# Patient Record
Sex: FEMALE | Race: OTHER | NOT HISPANIC OR LATINO
[De-identification: names, ages, dates, MRNs, and addresses within clinical notes are randomized per-mention and may not be internally consistent; named-entity substitution may affect disease eponyms.]

---

## 2021-10-05 PROBLEM — Z00.00 ENCOUNTER FOR PREVENTIVE HEALTH EXAMINATION: Status: ACTIVE | Noted: 2021-10-05

## 2021-10-06 ENCOUNTER — APPOINTMENT (OUTPATIENT)
Dept: BREAST CENTER | Facility: CLINIC | Age: 46
End: 2021-10-06
Payer: COMMERCIAL

## 2021-10-06 VITALS
WEIGHT: 160 LBS | HEART RATE: 76 BPM | BODY MASS INDEX: 27.31 KG/M2 | HEIGHT: 64 IN | SYSTOLIC BLOOD PRESSURE: 120 MMHG | DIASTOLIC BLOOD PRESSURE: 90 MMHG

## 2021-10-06 DIAGNOSIS — Z80.7 FAMILY HISTORY OF OTHER MALIGNANT NEOPLASMS OF LYMPHOID, HEMATOPOIETIC AND RELATED TISSUES: ICD-10-CM

## 2021-10-06 DIAGNOSIS — N83.519 TORSION OF OVARY AND OVARIAN PEDICLE, UNSPECIFIED SIDE: ICD-10-CM

## 2021-10-06 DIAGNOSIS — Z78.9 OTHER SPECIFIED HEALTH STATUS: ICD-10-CM

## 2021-10-06 PROCEDURE — 99205 OFFICE O/P NEW HI 60 MIN: CPT

## 2021-10-06 NOTE — REVIEW OF SYSTEMS
[Fever] : no fever [Chills] : no chills [Chest Pain] : no chest pain [Palpitations] : no palpitations [Shortness Of Breath] : no shortness of breath [Cough] : no cough [Abdominal Pain] : no abdominal pain [Constipation] : no constipation [Pelvic Pain] : no pelvic pain [Abn Vaginal Bleeding] : no unexplained vaginal bleeding [Joint Swelling] : no joint swelling [Joint Stiffness] : no joint stiffness [Skin Lesions] : no skin lesions [Skin Wound] : no skin wound [Dizziness] : no dizziness [Fainting] : no fainting [Hot Flashes] : no hot flashes [Muscle Weakness] : no muscle weakness [Swollen Glands] : no swollen glands [Swollen Glands In The Neck] : no swollen glands in the neck

## 2021-10-06 NOTE — PAST MEDICAL HISTORY
[Menstruating] : The patient is menstruating [Menarche Age ____] : age at menarche was [unfilled] [Definite ___ (Date)] : the last menstrual period was [unfilled] [Regular Cycle Intervals] : have been regular [Total Preg ___] : G[unfilled] [History of Hormone Replacement Treatment] : has no history of hormone replacement treatment [FreeTextEntry5] : laparoscopic removal of right ovary and fallopian tbe [FreeTextEntry6] : n/a [FreeTextEntry7] : up until 10/5/21 [FreeTextEntry8] : n/a

## 2021-10-06 NOTE — PHYSICAL EXAM
[de-identified] : Breasts: Bilateral breast, axilla, supraclavicular area: No masses, discharge or adenopathy\par  [de-identified] : 1:00 post bx mild swelling at site of biopsy

## 2021-10-06 NOTE — HISTORY OF PRESENT ILLNESS
[FreeTextEntry1] : 10/6/21: Patient is a 45 y.o  nulliparous female ([place] descent) presenting initially with newly diagnosed LEFT 11:00 3fn 0.7invasive mammary carcinoma with lobular features found on screening US. IHC: ER positive 90% [place] positive 90% HER-2 negative, grade 2 with DCIS present. Paternal aunt- breast CA at 39. She denies palp mass or nipple discharge.\par \par 8/20/19 - augustine MG and US - BIRAD 2\par \par 9/1/21: BL screening mammo/US: dense,  LEFT 0.7x0.6x06 cm vertically oriented irregular mass @ 11:00 3cmfn which is suspicious in appearance - BIRAD 4 \par \par 9/29/21: L US core bx -  invasive mammary carcinoma with lobular features  ER positive 90% CA positive 90% HER-2 negative, grade 2 with DCIS present. Heart clip. \par \par \par \par

## 2021-10-07 ENCOUNTER — RESULT REVIEW (OUTPATIENT)
Age: 46
End: 2021-10-07

## 2021-10-07 PROCEDURE — 88321 CONSLTJ&REPRT SLD PREP ELSWR: CPT

## 2021-10-08 ENCOUNTER — OUTPATIENT (OUTPATIENT)
Dept: OUTPATIENT SERVICES | Facility: HOSPITAL | Age: 46
LOS: 1 days | End: 2021-10-08
Payer: COMMERCIAL

## 2021-10-08 DIAGNOSIS — C50.911 MALIGNANT NEOPLASM OF UNSPECIFIED SITE OF RIGHT FEMALE BREAST: ICD-10-CM

## 2021-10-08 PROCEDURE — 88321 CONSLTJ&REPRT SLD PREP ELSWR: CPT

## 2021-10-11 LAB — SURGICAL PATHOLOGY STUDY: SIGNIFICANT CHANGE UP

## 2021-10-14 ENCOUNTER — NON-APPOINTMENT (OUTPATIENT)
Age: 46
End: 2021-10-14

## 2021-10-15 ENCOUNTER — NON-APPOINTMENT (OUTPATIENT)
Age: 46
End: 2021-10-15

## 2021-10-18 ENCOUNTER — RESULT REVIEW (OUTPATIENT)
Age: 46
End: 2021-10-18

## 2021-10-20 ENCOUNTER — APPOINTMENT (OUTPATIENT)
Dept: BREAST CENTER | Facility: CLINIC | Age: 46
End: 2021-10-20

## 2021-10-21 ENCOUNTER — RESULT REVIEW (OUTPATIENT)
Age: 46
End: 2021-10-21

## 2021-10-22 ENCOUNTER — NON-APPOINTMENT (OUTPATIENT)
Age: 46
End: 2021-10-22

## 2021-11-16 ENCOUNTER — APPOINTMENT (OUTPATIENT)
Dept: BREAST CENTER | Facility: AMBULATORY SURGERY CENTER | Age: 46
End: 2021-11-16
Payer: COMMERCIAL

## 2021-11-16 PROCEDURE — 76098 X-RAY EXAM SURGICAL SPECIMEN: CPT | Mod: 59

## 2021-11-16 PROCEDURE — 14000 TIS TRNFR TRUNK 10 SQ CM/<: CPT | Mod: 59

## 2021-11-16 PROCEDURE — 38525 BIOPSY/REMOVAL LYMPH NODES: CPT | Mod: LT

## 2021-11-16 PROCEDURE — 19301 PARTIAL MASTECTOMY: CPT | Mod: LT

## 2021-11-17 ENCOUNTER — RESULT REVIEW (OUTPATIENT)
Age: 46
End: 2021-11-17

## 2021-11-18 PROBLEM — Z80.3 FAMILY HISTORY OF MALIGNANT NEOPLASM OF BREAST: Status: ACTIVE | Noted: 2021-10-06

## 2021-11-24 ENCOUNTER — APPOINTMENT (OUTPATIENT)
Dept: BREAST CENTER | Facility: CLINIC | Age: 46
End: 2021-11-24
Payer: COMMERCIAL

## 2021-11-24 VITALS
DIASTOLIC BLOOD PRESSURE: 83 MMHG | BODY MASS INDEX: 27.31 KG/M2 | SYSTOLIC BLOOD PRESSURE: 132 MMHG | HEIGHT: 64 IN | HEART RATE: 123 BPM | WEIGHT: 160 LBS

## 2021-11-24 DIAGNOSIS — R92.8 OTHER ABNORMAL AND INCONCLUSIVE FINDINGS ON DIAGNOSTIC IMAGING OF BREAST: ICD-10-CM

## 2021-11-24 DIAGNOSIS — Z80.3 FAMILY HISTORY OF MALIGNANT NEOPLASM OF BREAST: ICD-10-CM

## 2021-11-24 PROCEDURE — 99024 POSTOP FOLLOW-UP VISIT: CPT

## 2021-11-29 ENCOUNTER — APPOINTMENT (OUTPATIENT)
Dept: HEMATOLOGY ONCOLOGY | Facility: CLINIC | Age: 46
End: 2021-11-29
Payer: COMMERCIAL

## 2021-11-29 VITALS
TEMPERATURE: 98.4 F | BODY MASS INDEX: 25.95 KG/M2 | OXYGEN SATURATION: 100 % | HEART RATE: 104 BPM | RESPIRATION RATE: 18 BRPM | HEIGHT: 64 IN | SYSTOLIC BLOOD PRESSURE: 112 MMHG | WEIGHT: 152 LBS | DIASTOLIC BLOOD PRESSURE: 73 MMHG

## 2021-11-29 PROCEDURE — 99204 OFFICE O/P NEW MOD 45 MIN: CPT

## 2021-11-29 RX ORDER — FEXOFENADINE HCL 60 MG
CAPSULE ORAL
Refills: 0 | Status: DISCONTINUED | COMMUNITY
End: 2021-11-29

## 2021-11-29 RX ORDER — SERTRALINE HYDROCHLORIDE 25 MG/1
TABLET, FILM COATED ORAL
Refills: 0 | Status: DISCONTINUED | COMMUNITY
End: 2021-11-29

## 2021-11-30 NOTE — HISTORY OF PRESENT ILLNESS
[Disease: _____________________] : Disease: [unfilled] [T: ___] : T[unfilled] [N: ___] : N[unfilled] [M: ___] : M[unfilled] [AJCC Stage: ____] : AJCC Stage: [unfilled] [de-identified] : 46F, with newly diagnosed left invasive lobular carcinoma s/p 11/16/21 left lumpectomy and SLNB, is referred by Dr. Romelia Berg to discuss adjuvant therapy.\par \par OncHx:\par 9/1/21: BL screening mammo/US: dense, LEFT 0.7 x 0.6 x 06 cm vertically oriented irregular mass @ 11:00 3cmfn which is suspicious in appearance.\par  \par 9/29/21: L US core bx - invasive mammary carcinoma with lobular features ER positive 90% [place] positive 90% HER-2 negative, grade 2 with DCIS present. Heart clip. \par \par 10/14/21: MRI- Dense. L 0.8cm upper central clumped linear nonmass enhancement, bx proven malignancy. No evidence of skin, nipple or chest wall involvement. L 0.95cm central posterior, 4FN clumped linear nonmass enhancement. R 1.1cm central posterior, 3FN clumped nonmass enhancement. R 11:00, 4FN clumped nonmass enhancement w/ adjacent mass. No adenopathy. \par \par 10/18/21: L MRI guided bx for central enhancement- "Carlos" clip. LCIS and small IDP. Concordant.\par the clip is approximately 1 cm medial to the biopsy site which was located centrally in the breast\par \par 10/22/21: R MRI guided bx x2:\par SITE 1: R upper outer, 11:00 enhancement- "Cylinder" clip. Benign breast tissue w/ PASH. \par SITE 2: R central enhancement- "Barbell" clip. Fibrocystic changes. Concordant.\par \par 11/16/21: L NLOC lumpectomy and SLNS: 0.8cm ILC- clear margins, extensive LCIS, 0/1 LN negative for carcinoma \par \par Paternal aunt- breast CA at 39. \par  [de-identified] : moderately differentiated invasive lobular carcinoma [de-identified] : ER+, CO+, HER2-, Oncotype RS pending [de-identified] : Genetic testing negative

## 2021-12-02 ENCOUNTER — NON-APPOINTMENT (OUTPATIENT)
Age: 46
End: 2021-12-02

## 2021-12-09 ENCOUNTER — NON-APPOINTMENT (OUTPATIENT)
Age: 46
End: 2021-12-09

## 2021-12-15 ENCOUNTER — APPOINTMENT (OUTPATIENT)
Dept: RADIATION ONCOLOGY | Facility: CLINIC | Age: 46
End: 2021-12-15
Payer: COMMERCIAL

## 2021-12-15 VITALS
WEIGHT: 149 LBS | BODY MASS INDEX: 25.44 KG/M2 | OXYGEN SATURATION: 100 % | HEIGHT: 64 IN | HEART RATE: 105 BPM | SYSTOLIC BLOOD PRESSURE: 135 MMHG | DIASTOLIC BLOOD PRESSURE: 84 MMHG | TEMPERATURE: 98.6 F

## 2021-12-15 PROCEDURE — 99202 OFFICE O/P NEW SF 15 MIN: CPT | Mod: 25

## 2021-12-15 NOTE — REVIEW OF SYSTEMS
[Negative] : Endocrine [Constipation: Grade 0] : Constipation: Grade 0 [Diarrhea: Grade 0] : Diarrhea: Grade 0 [Dysphagia: Grade 0] : Dysphagia: Grade 0 [Nausea: Grade 0] : Nausea: Grade 0 [Vomiting: Grade 0] : Vomiting: Grade 0 [Fatigue: Grade 0] : Fatigue: Grade 0 [Localized Edema: Grade 0] : Localized Edema: Grade 0  [Skin Atrophy: Grade 0] : Skin Atrophy: Grade 0 [Skin Hyperpigmentation: Grade 0] : Skin Hyperpigmentation: Grade 0 [Skin Induration: Grade 0] : Skin Induration: Grade 0 [Dermatitis Radiation: Grade 0] : Dermatitis Radiation: Grade 0

## 2021-12-16 ENCOUNTER — NON-APPOINTMENT (OUTPATIENT)
Age: 46
End: 2021-12-16

## 2021-12-29 ENCOUNTER — TRANSCRIPTION ENCOUNTER (OUTPATIENT)
Age: 46
End: 2021-12-29

## 2022-01-21 NOTE — REVIEW OF SYSTEMS
[Constipation: Grade 0] : Constipation: Grade 0 [Diarrhea: Grade 0] : Diarrhea: Grade 0 [Dysphagia: Grade 0] : Dysphagia: Grade 0 [Nausea: Grade 0] : Nausea: Grade 0 [Vomiting: Grade 0] : Vomiting: Grade 0 [Fatigue: Grade 0] : Fatigue: Grade 0 [Localized Edema: Grade 0] : Localized Edema: Grade 0  [Skin Atrophy: Grade 0] : Skin Atrophy: Grade 0 [Skin Hyperpigmentation: Grade 0] : Skin Hyperpigmentation: Grade 0 [Skin Induration: Grade 0] : Skin Induration: Grade 0 [Dermatitis Radiation: Grade 0] : Dermatitis Radiation: Grade 0 [Negative] : Endocrine

## 2022-01-24 ENCOUNTER — NON-APPOINTMENT (OUTPATIENT)
Age: 47
End: 2022-01-24

## 2022-01-24 NOTE — PHYSICAL EXAM
[Symmetric] : breasts are symmetric [Breast Abnormal Lactation (Galactorrhea)] : no nipple discharge [No UE Edema] : there is no upper extremity edema [Normal] : no respiratory distress, lungs were clear to auscultation bilaterally [Normal] : normal external genitalia [de-identified] : Healing surgical incisions in the upper aspect (12 o'clock) of the  left breast and left axilla

## 2022-01-24 NOTE — HISTORY OF PRESENT ILLNESS
[FreeTextEntry1] : 01/24/2022:  has completed 4/15 fx to the left breast. Patient states she has no symptoms. No redness noted on left breast area. Compliant with Aquaphor. Continue RT.\par \par Cinda Marshall was seen today to discuss post-operative radiation for her breast cancer.   She is a 46 year old with a family history who had an abnormal screening mammogram in September.  This found a suspicious 7 mm nodule at 11N3 in the left breast.  On biopsy this proved to be malignant.  After discussion she elected breast conservation.  On 11/17/21 she underwent a local excision and sentinel node biopsy.  \par The final patholgoy:\par 0.8 cm, grade 2 lobular cancer.  Estrogen (95%) and progesterone (95%) receptors were positive.  HER2/damian was not amplified. \par All final margins were clear.  \par One sentinel node was negative (0/1) \par There was LCIS\par No genetic mutations\par Oncotype-DX score was 9, no chemo benefit (<1%), 3% distant recurrence risk at 9 yrs. \par \par \par MS.MAUREEN MARSHALL is a 46 year old female nulliparous female presenting initially with newly diagnosed LEFT BREAST CANCER 11:00 3fn 0.7 invasive mammary carcinoma with lobular features found on screening US. \par IHC: ER positive 90% [place] positive 90% HER-2 negative, GRADE 2 with DCIS present. \par Paternal aunt- breast CA at 39. She denies palp mass or nipple discharge.\par ONCOTYPE RS of 9. TO START TAMOXIFEN AFTER RT.\par Patient presents with finding during breast ultrasound which picked up on the lesions. There were 2 benign lesion on the right breast and 1 malignant lesion on the left breast. Later a 2nd lesion on right breast was picked up by an MRI. \par PSH: Left breast Lumpectomy and [place] LN Biopsy done on 11/16/21\par \par 8/20/19 - Bilateral mammography and US - BIRAD 2\par \par 9/1/21: Bilateral screening mammograph/US: dense, LEFT 0.7x0.6x06 cm vertically oriented irregular mass @ 11:00 3cmfn which is suspicious in appearance - BIRAD 4 \par \par 9/29/21: Left US core biopsy - invasive mammary carcinoma with lobular features ER positive 90% [place] positive 90% HER-2 negative, grade 2 with DCIS present. Heart clip. \par \par 10/14/21: MRI- Dense. L 0.8cm upper central clumped linear nonmass enhancement, bx proven malignancy. No evidence of skin, nipple or chest wall involvement. L 0.95cm central posterior, 4FN clumped linear nonmass enhancement (Rec. MRI guided bx). R 1.1cm central posterior, 3FN clumped nonmass enhancement (MRI bx rec.). R 11:00, 4FN clumped nonmass enhancement w/ adjacent mass (MR bx rec.) No adenopathy. BIRADS 4\par \par 10/18/21: L MRI guided bx for central enhancement- "Carlos" clip. LCIS and small IDP. Concordant.\par The clip is approximately 1 cm medial to the biopsy site which was located centrally in the breast\par \par 10/22/21: R MRI guided bx x2:\par SITE 1: R upper outer, 11:00 enhancement- "Cylinder" clip. Benign breast tissue w/ PASH. \par SITE 2: R central enhancement- "Barbell" clip. Fibrocystic changes. Concordant.\par \par 11/16/21: L NLOC lumpectomy and SLNS: 0.8cm ILC- clear margins, extensive LCIS, 0/1 LN negative for carcinoma \par \par 11/17/2021: PATHOLOGY REPORT \par 1)LEFT BREAST EXCISION:\par -Invasive lobular carcinoma, moderately differentiated.\par -Invasive carcinoma spans 0.8cm by microscopic measurement.\par -Extensive lobular carcinoma in situ (LCIS).\par Margins as present on this specimen\par   Lateral: Positive\par   Inferior: 4.5 mm\par   All remaining margins: >5mm\par -Biopsy site changes are present.\par \par 2)LEFT BREAST ANTERIOR INFERIOR MARGINS \par -Lobular carcinoma in situ (LCIS)\par \par TUMOR\par Histological Type: Invasive Lobular Carcinoma\par Glandular (Acinar)/Tubular Differentiation: Score 3\par Nuclear Pleomorphism: score 2\par Mitotic rate: score 1\par Overall grade: Grade 2 (scores 6 or 7)\par Tumor size: greatest dimension of largest invasive focus (mm) -8mm\par Tumor Focality:Single focus of invasive carcinoma\par Ductal Carcinoma in Situ (DCIS): Not present\par Lobular Carcinoma in Situ (LCIS): present, extensive.\par \par HEME ONCOLOGIST: \par BREAST SURGEON: DR.ROSENBAUM PARKER\par \par 12/15/2021: NEW CONSULT\par Patient was seen as a new consult. Patient is asymptomatic. Surgical incisions on left breast and left axilla. Educated on the procedure and side effects of RT and on the importance of having Aquaphor or Eucerin on the radiated site.

## 2022-01-24 NOTE — VITALS
[Maximal Pain Intensity: 0/10] : 0/10 [Least Pain Intensity: 0/10] : 0/10 [90: Able to carry normal activity; minor signs or symptoms of disease.] : 90: Able to carry normal activity; minor signs or symptoms of disease.  [90: Minor restrictions in physically strenous activity.] : 90: Minor restrictions in physically strenuous activity.

## 2022-01-31 ENCOUNTER — NON-APPOINTMENT (OUTPATIENT)
Age: 47
End: 2022-01-31

## 2022-01-31 VITALS
HEIGHT: 64 IN | OXYGEN SATURATION: 100 % | WEIGHT: 148 LBS | DIASTOLIC BLOOD PRESSURE: 75 MMHG | HEART RATE: 104 BPM | TEMPERATURE: 98.1 F | BODY MASS INDEX: 25.27 KG/M2 | SYSTOLIC BLOOD PRESSURE: 124 MMHG

## 2022-01-31 NOTE — HISTORY OF PRESENT ILLNESS
[FreeTextEntry1] : 01/31/2022: OTV-  has completed 9/15 fx to the Left partial breast.  She is doing well with minimal redness.  Her chief complaint is of fatigue, however she states it is not severe.  \par \par 01/24/2022: ZHEN Brandt has completed 4/15 fx to the left breast. Patient states she has no symptoms. No redness noted on left breast area. Compliant with Aquaphor. Continue RT.\par \par Cinda Marshall was seen today to discuss post-operative radiation for her breast cancer.   She is a 46 year old with a family history who had an abnormal screening mammogram in September.  This found a suspicious 7 mm nodule at 11N3 in the left breast.  On biopsy this proved to be malignant.  After discussion she elected breast conservation.  On 11/17/21 she underwent a local excision and sentinel node biopsy.  \par The final patholgoy:\par 0.8 cm, grade 2 lobular cancer.  Estrogen (95%) and progesterone (95%) receptors were positive.  HER2/damian was not amplified. \par All final margins were clear.  \par One sentinel node was negative (0/1) \par There was LCIS\par No genetic mutations\par Oncotype-DX score was 9, no chemo benefit (<1%), 3% distant recurrence risk at 9 yrs. \par \par \par MS.MAUREEN MARSHALL is a 46 year old female nulliparous female presenting initially with newly diagnosed LEFT BREAST CANCER 11:00 3fn 0.7 invasive mammary carcinoma with lobular features found on screening US. \par IHC: ER positive 90% [place] positive 90% HER-2 negative, GRADE 2 with DCIS present. \par Paternal aunt- breast CA at 39. She denies palp mass or nipple discharge.\par ONCOTYPE RS of 9. TO START TAMOXIFEN AFTER RT.\par Patient presents with finding during breast ultrasound which picked up on the lesions. There were 2 benign lesion on the right breast and 1 malignant lesion on the left breast. Later a 2nd lesion on right breast was picked up by an MRI. \par PSH: Left breast Lumpectomy and [place] LN Biopsy done on 11/16/21\par \par 8/20/19 - Bilateral mammography and US - BIRAD 2\par \par 9/1/21: Bilateral screening mammograph/US: dense, LEFT 0.7x0.6x06 cm vertically oriented irregular mass @ 11:00 3cmfn which is suspicious in appearance - BIRAD 4 \par \par 9/29/21: Left US core biopsy - invasive mammary carcinoma with lobular features ER positive 90% [place] positive 90% HER-2 negative, grade 2 with DCIS present. Heart clip. \par \par 10/14/21: MRI- Dense. L 0.8cm upper central clumped linear nonmass enhancement, bx proven malignancy. No evidence of skin, nipple or chest wall involvement. L 0.95cm central posterior, 4FN clumped linear nonmass enhancement (Rec. MRI guided bx). R 1.1cm central posterior, 3FN clumped nonmass enhancement (MRI bx rec.). R 11:00, 4FN clumped nonmass enhancement w/ adjacent mass (MR bx rec.) No adenopathy. BIRADS 4\par \par 10/18/21: L MRI guided bx for central enhancement- "Carlos" clip. LCIS and small IDP. Concordant.\par The clip is approximately 1 cm medial to the biopsy site which was located centrally in the breast\par \par 10/22/21: R MRI guided bx x2:\par SITE 1: R upper outer, 11:00 enhancement- "Cylinder" clip. Benign breast tissue w/ PASH. \par SITE 2: R central enhancement- "Barbell" clip. Fibrocystic changes. Concordant.\par \par 11/16/21: L NLOC lumpectomy and SLNS: 0.8cm ILC- clear margins, extensive LCIS, 0/1 LN negative for carcinoma \par \par 11/17/2021: PATHOLOGY REPORT \par 1)LEFT BREAST EXCISION:\par -Invasive lobular carcinoma, moderately differentiated.\par -Invasive carcinoma spans 0.8cm by microscopic measurement.\par -Extensive lobular carcinoma in situ (LCIS).\par Margins as present on this specimen\par   Lateral: Positive\par   Inferior: 4.5 mm\par   All remaining margins: >5mm\par -Biopsy site changes are present.\par \par 2)LEFT BREAST ANTERIOR INFERIOR MARGINS \par -Lobular carcinoma in situ (LCIS)\par \par TUMOR\par Histological Type: Invasive Lobular Carcinoma\par Glandular (Acinar)/Tubular Differentiation: Score 3\par Nuclear Pleomorphism: score 2\par Mitotic rate: score 1\par Overall grade: Grade 2 (scores 6 or 7)\par Tumor size: greatest dimension of largest invasive focus (mm) -8mm\par Tumor Focality:Single focus of invasive carcinoma\par Ductal Carcinoma in Situ (DCIS): Not present\par Lobular Carcinoma in Situ (LCIS): present, extensive.\par \par HEME ONCOLOGIST: \par BREAST SURGEON: DR.ROSENBAUM PARKER\par \par 12/15/2021: NEW CONSULT\par Patient was seen as a new consult. Patient is asymptomatic. Surgical incisions on left breast and left axilla. Educated on the procedure and side effects of RT and on the importance of having Aquaphor or Eucerin on the radiated site.

## 2022-01-31 NOTE — DISEASE MANAGEMENT
[Pathological] : TNM Stage: p [IA] : IA [TTNM] : 1b [NTNM] : 0 [MTNM] : 0 [de-identified] : [id number] [de-identified] : [id number] [de-identified] : LEFT Partial Breast

## 2022-01-31 NOTE — PHYSICAL EXAM
[Symmetric] : breasts are symmetric [Breast Abnormal Lactation (Galactorrhea)] : no nipple discharge [No UE Edema] : there is no upper extremity edema [Normal] : no focal deficits [de-identified] : Faint erythema of the skin of the left breast.

## 2022-02-07 ENCOUNTER — NON-APPOINTMENT (OUTPATIENT)
Age: 47
End: 2022-02-07

## 2022-02-07 VITALS
HEART RATE: 100 BPM | DIASTOLIC BLOOD PRESSURE: 90 MMHG | WEIGHT: 146 LBS | SYSTOLIC BLOOD PRESSURE: 138 MMHG | OXYGEN SATURATION: 100 % | TEMPERATURE: 98.1 F | BODY MASS INDEX: 24.92 KG/M2 | HEIGHT: 64 IN

## 2022-02-07 NOTE — DISEASE MANAGEMENT
[Pathological] : TNM Stage: p [IA] : IA [TTNM] : 1b [NTNM] : 0 [MTNM] : 0 [de-identified] : [id number] [de-identified] : [id number] [de-identified] : LEFT Partial Breast Pt received semisupine in bed NAD, +IVL, +tele, +cont pulse ox, +BP cuff, VSS, agreeable to participate in therapy at this time

## 2022-02-07 NOTE — PHYSICAL EXAM
[Symmetric] : breasts are symmetric [Breast Abnormal Lactation (Galactorrhea)] : no nipple discharge [No UE Edema] : there is no upper extremity edema [Normal] : no focal deficits [de-identified] : modest, albeir confluent, erythema of the left breast.

## 2022-02-07 NOTE — REVIEW OF SYSTEMS
[Anal Pain: Grade 0] : Anal Pain: Grade 0 [Constipation: Grade 0] : Constipation: Grade 0 [Diarrhea: Grade 0] : Diarrhea: Grade 0 [Dyspepsia: Grade 0] : Dyspepsia: Grade 0 [Dysphagia: Grade 0] : Dysphagia: Grade 0 [Nausea: Grade 0] : Nausea: Grade 0 [Vomiting: Grade 0] : Vomiting: Grade 0 [Edema Limbs: Grade 0] : Edema Limbs: Grade 0  [Fatigue: Grade 0] : Fatigue: Grade 0 [Localized Edema: Grade 0] : Localized Edema: Grade 0  [Neck Edema: Grade 0] : Neck Edema: Grade 0 [Alopecia: Grade 0] : Alopecia: Grade 0 [Pruritus: Grade 0] : Pruritus: Grade 0 [Skin Atrophy: Grade 0] : Skin Atrophy: Grade 0 [Skin Hyperpigmentation: Grade 0] : Skin Hyperpigmentation: Grade 0 [Skin Induration: Grade 0] : Skin Induration: Grade 0 [Dermatitis Radiation: Grade 1 - Faint erythema or dry desquamation] : Dermatitis Radiation: Grade 1 - Faint erythema or dry desquamation

## 2022-02-07 NOTE — HISTORY OF PRESENT ILLNESS
[FreeTextEntry1] : 02/07/2022: FINAL OTVRahul Brandt has completed 14/15 fx to the Left partial breast. Minimal redness noted on left breast. In addition, patient slipped and fell on her breast on Friday. Denies pain or fatigue. Continue RT.\par \par 01/31/2022: ZHEN Brandt has completed 9/15 fx to the Left partial breast.  She is doing well with minimal redness.  Her chief complaint is of fatigue, however she states it is not severe.  \par \par 01/24/2022: OTDANK Brandt has completed 4/15 fx to the left breast. Patient states she has no symptoms. No redness noted on left breast area. Compliant with Aquaphor. Continue RT.\par \par Cinda Marshall was seen today to discuss post-operative radiation for her breast cancer.   She is a 46 year old with a family history who had an abnormal screening mammogram in September.  This found a suspicious 7 mm nodule at 11N3 in the left breast.  On biopsy this proved to be malignant.  After discussion she elected breast conservation.  On 11/17/21 she underwent a local excision and sentinel node biopsy.  \par The final patholgoy:\par 0.8 cm, grade 2 lobular cancer.  Estrogen (95%) and progesterone (95%) receptors were positive.  HER2/damian was not amplified. \par All final margins were clear.  \par One sentinel node was negative (0/1) \par There was LCIS\par No genetic mutations\par Oncotype-DX score was 9, no chemo benefit (<1%), 3% distant recurrence risk at 9 yrs. \par \par \par MS.MAUREEN MARSHALL is a 46 year old female nulliparous female presenting initially with newly diagnosed LEFT BREAST CANCER 11:00 3fn 0.7 invasive mammary carcinoma with lobular features found on screening US. \par IHC: ER positive 90% [place] positive 90% HER-2 negative, GRADE 2 with DCIS present. \par Paternal aunt- breast CA at 39. She denies palp mass or nipple discharge.\par ONCOTYPE RS of 9. TO START TAMOXIFEN AFTER RT.\par Patient presents with finding during breast ultrasound which picked up on the lesions. There were 2 benign lesion on the right breast and 1 malignant lesion on the left breast. Later a 2nd lesion on right breast was picked up by an MRI. \par PSH: Left breast Lumpectomy and [place] LN Biopsy done on 11/16/21\par \par 8/20/19 - Bilateral mammography and US - BIRAD 2\par \par 9/1/21: Bilateral screening mammograph/US: dense, LEFT 0.7x0.6x06 cm vertically oriented irregular mass @ 11:00 3cmfn which is suspicious in appearance - BIRAD 4 \par \par 9/29/21: Left US core biopsy - invasive mammary carcinoma with lobular features ER positive 90% [place] positive 90% HER-2 negative, grade 2 with DCIS present. Heart clip. \par \par 10/14/21: MRI- Dense. L 0.8cm upper central clumped linear nonmass enhancement, bx proven malignancy. No evidence of skin, nipple or chest wall involvement. L 0.95cm central posterior, 4FN clumped linear nonmass enhancement (Rec. MRI guided bx). R 1.1cm central posterior, 3FN clumped nonmass enhancement (MRI bx rec.). R 11:00, 4FN clumped nonmass enhancement w/ adjacent mass (MR bx rec.) No adenopathy. BIRADS 4\par \par 10/18/21: L MRI guided bx for central enhancement- "Carlos" clip. LCIS and small IDP. Concordant.\par The clip is approximately 1 cm medial to the biopsy site which was located centrally in the breast\par \par 10/22/21: R MRI guided bx x2:\par SITE 1: R upper outer, 11:00 enhancement- "Cylinder" clip. Benign breast tissue w/ PASH. \par SITE 2: R central enhancement- "Barbell" clip. Fibrocystic changes. Concordant.\par \par 11/16/21: L NLOC lumpectomy and SLNS: 0.8cm ILC- clear margins, extensive LCIS, 0/1 LN negative for carcinoma \par \par 11/17/2021: PATHOLOGY REPORT \par 1)LEFT BREAST EXCISION:\par -Invasive lobular carcinoma, moderately differentiated.\par -Invasive carcinoma spans 0.8cm by microscopic measurement.\par -Extensive lobular carcinoma in situ (LCIS).\par Margins as present on this specimen\par   Lateral: Positive\par   Inferior: 4.5 mm\par   All remaining margins: >5mm\par -Biopsy site changes are present.\par \par 2)LEFT BREAST ANTERIOR INFERIOR MARGINS \par -Lobular carcinoma in situ (LCIS)\par \par TUMOR\par Histological Type: Invasive Lobular Carcinoma\par Glandular (Acinar)/Tubular Differentiation: Score 3\par Nuclear Pleomorphism: score 2\par Mitotic rate: score 1\par Overall grade: Grade 2 (scores 6 or 7)\par Tumor size: greatest dimension of largest invasive focus (mm) -8mm\par Tumor Focality:Single focus of invasive carcinoma\par Ductal Carcinoma in Situ (DCIS): Not present\par Lobular Carcinoma in Situ (LCIS): present, extensive.\par \par HEME ONCOLOGIST: \par BREAST SURGEON: DR.ROSENBAUM PARKER\par \par 12/15/2021: NEW CONSULT\par Patient was seen as a new consult. Patient is asymptomatic. Surgical incisions on left breast and left axilla. Educated on the procedure and side effects of RT and on the importance of having Aquaphor or Eucerin on the radiated site.

## 2022-02-10 ENCOUNTER — APPOINTMENT (OUTPATIENT)
Dept: HEMATOLOGY ONCOLOGY | Facility: CLINIC | Age: 47
End: 2022-02-10
Payer: COMMERCIAL

## 2022-02-10 VITALS
TEMPERATURE: 98.5 F | HEART RATE: 101 BPM | DIASTOLIC BLOOD PRESSURE: 79 MMHG | WEIGHT: 146 LBS | BODY MASS INDEX: 24.92 KG/M2 | HEIGHT: 64 IN | SYSTOLIC BLOOD PRESSURE: 124 MMHG | RESPIRATION RATE: 18 BRPM | OXYGEN SATURATION: 100 %

## 2022-02-10 PROCEDURE — 99214 OFFICE O/P EST MOD 30 MIN: CPT

## 2022-02-10 NOTE — PHYSICAL EXAM
[Normal] : affect appropriate [de-identified] : No mass palpated b/l [de-identified] : Radiation-induced skin changes over left breast area.

## 2022-02-10 NOTE — HISTORY OF PRESENT ILLNESS
[Disease: _____________________] : Disease: [unfilled] [T: ___] : T[unfilled] [N: ___] : N[unfilled] [M: ___] : M[unfilled] [AJCC Stage: ____] : AJCC Stage: [unfilled] [de-identified] : 46F, with newly diagnosed left invasive lobular carcinoma s/p 11/16/21 left lumpectomy and SLNB, is referred by Dr. Romelia Berg to discuss adjuvant therapy.\par \par OncHx:\par 9/1/21: BL screening mammo/US: dense, LEFT 0.7 x 0.6 x 06 cm vertically oriented irregular mass @ 11:00 3cmfn which is suspicious in appearance.\par  \par 9/29/21: L US core bx - invasive mammary carcinoma with lobular features ER positive 90% FL positive 90% HER-2 negative, grade 2 with DCIS present. Heart clip. \par \par 10/14/21: MRI- Dense. L 0.8cm upper central clumped linear nonmass enhancement, bx proven malignancy. No evidence of skin, nipple or chest wall involvement. L 0.95cm central posterior, 4FN clumped linear nonmass enhancement. R 1.1cm central posterior, 3FN clumped nonmass enhancement. R 11:00, 4FN clumped nonmass enhancement w/ adjacent mass. No adenopathy. \par \par 10/18/21: L MRI guided bx for central enhancement- "Carlos" clip. LCIS and small IDP. Concordant.\par the clip is approximately 1 cm medial to the biopsy site which was located centrally in the breast\par \par 10/22/21: R MRI guided bx x2:\par SITE 1: R upper outer, 11:00 enhancement- "Cylinder" clip. Benign breast tissue w/ PASH. \par SITE 2: R central enhancement- "Barbell" clip. Fibrocystic changes. Concordant.\par \par 11/16/21: L NLOC lumpectomy and SLNS: 0.8cm ILC- clear margins, extensive LCIS, 0/1 LN negative for carcinoma \par \par Paternal aunt- breast CA at 39. \par  [de-identified] : moderately differentiated invasive lobular carcinoma [de-identified] : ER+, NH+, HER2-, Oncotype RS 9 [de-identified] : Genetic testing negative [de-identified] : She completed RT on 2/8/22. She has mild radiation-induced erythema, using Eucerin cream.  She is feeling well overall and in good spirits.

## 2022-02-15 NOTE — HISTORY OF PRESENT ILLNESS
[FreeTextEntry1] : Cinda Marshall was seen today to discuss post-operative radiation for her breast cancer.   She is a 46 year old with a family history who had an abnormal screening mammogram in September.  This found a suspicious 7 mm nodule at 11N3 in the left breast.  On biopsy this proved to be malignant.  After discussion she elected breast conservation.  On 11/17/21 she underwent a local excision and sentinel node biopsy.  \par The final patholgoy:\par 0.8 cm, grade 2 lobular cancer.  Estrogen (95%) and progesterone (95%) receptors were positive.  HER2/damian was amplified. \par All final margins were clear.  \par One sentinel node was negative (0/1) \par There was LCIS\par No genetic mutations\par Oncotype-DX score was 9, no chemo benefit (<1%), 3% distant recurrence risk at 9 yrs. \par \par \par MS.MAUREEN MARSHALL is a 46 year old female nulliparous female presenting initially with newly diagnosed LEFT BREAST CANCER 11:00 3fn 0.7 invasive mammary carcinoma with lobular features found on screening US. \par IHC: ER positive 90% ID positive 90% HER-2 negative, GRADE 2 with DCIS present. \par Paternal aunt- breast CA at 39. She denies palp mass or nipple discharge.\par ONCOTYPE RS of 9. TO START TAMOXIFEN AFTER RT.\par Patient presents with finding during breast ultrasound which picked up on the lesions. There were 2 benign lesion on the right breast and 1 malignant lesion on the left breast. Later a 2nd lesion on right breast was picked up by an MRI. \par PSH: Left breast Lumpectomy and [place] LN Biopsy done on 11/16/21\par \par 8/20/19 - Bilateral mammography and US - BIRAD 2\par \par 9/1/21: Bilateral screening mammograph/US: dense, LEFT 0.7x0.6x06 cm vertically oriented irregular mass @ 11:00 3cmfn which is suspicious in appearance - BIRAD 4 \par \par 9/29/21: Left US core biopsy - invasive mammary carcinoma with lobular features ER positive 90% ID positive 90% HER-2 negative, grade 2 with DCIS present. Heart clip. \par \par 10/14/21: MRI- Dense. L 0.8cm upper central clumped linear nonmass enhancement, bx proven malignancy. No evidence of skin, nipple or chest wall involvement. L 0.95cm central posterior, 4FN clumped linear nonmass enhancement (Rec. MRI guided bx). R 1.1cm central posterior, 3FN clumped nonmass enhancement (MRI bx rec.). R 11:00, 4FN clumped nonmass enhancement w/ adjacent mass (MR bx rec.) No adenopathy. BIRADS 4\par \par 10/18/21: L MRI guided bx for central enhancement- "Carlos" clip. LCIS and small IDP. Concordant.\par The clip is approximately 1 cm medial to the biopsy site which was located centrally in the breast\par \par 10/22/21: R MRI guided bx x2:\par SITE 1: R upper outer, 11:00 enhancement- "Cylinder" clip. Benign breast tissue w/ PASH. \par SITE 2: R central enhancement- "Barbell" clip. Fibrocystic changes. Concordant.\par \par 11/16/21: L NLOC lumpectomy and SLNS: 0.8cm ILC- clear margins, extensive LCIS, 0/1 LN negative for carcinoma \par \par 11/17/2021: PATHOLOGY REPORT \par 1)LEFT BREAST EXCISION:\par -Invasive lobular carcinoma, moderately differentiated.\par -Invasive carcinoma spans 0.8cm by microscopic measurement.\par -Extensive lobular carcinoma in situ (LCIS).\par Margins as present on this specimen\par   Lateral: Positive\par   Inferior: 4.5 mm\par   All remaining margins: >5mm\par -Biopsy site changes are present.\par \par 2)LEFT BREAST ANTERIOR INFERIOR MARGINS \par -Lobular carcinoma in situ (LCIS)\par \par TUMOR\par Histological Type: Invasive Lobular Carcinoma\par Glandular (Acinar)/Tubular Differentiation: Score 3\par Nuclear Pleomorphism: score 2\par Mitotic rate: score 1\par Overall grade: Grade 2 (scores 6 or 7)\par Tumor size: greatest dimension of largest invasive focus (mm) -8mm\par Tumor Focality:Single focus of invasive carcinoma\par Ductal Carcinoma in Situ (DCIS): Not present\par Lobular Carcinoma in Situ (LCIS): present, extensive.\par \par HEME ONCOLOGIST: \par BREAST SURGEON: DR.ROSENBAUM PARKER\par \par 12/15/2021: NEW CONSULT\par Patient was seen as a new consult. Patient is asymptomatic. Surgical incisions on left breast and left axilla. Educated on the procedure and side effects of RT and on the importance of having Aquaphor or Eucerin on the radiated site.

## 2022-02-15 NOTE — PHYSICAL EXAM
[Symmetric] : breasts are symmetric [Breast Abnormal Lactation (Galactorrhea)] : no nipple discharge [No UE Edema] : there is no upper extremity edema [Normal] : no focal deficits [de-identified] : Healing surgical incisions in the upper aspect (12 o'clock) of the  left breast and left axilla

## 2022-03-07 ENCOUNTER — APPOINTMENT (OUTPATIENT)
Dept: RADIATION ONCOLOGY | Facility: CLINIC | Age: 47
End: 2022-03-07

## 2022-03-07 VITALS
TEMPERATURE: 98.5 F | RESPIRATION RATE: 16 BRPM | BODY MASS INDEX: 24.59 KG/M2 | SYSTOLIC BLOOD PRESSURE: 131 MMHG | OXYGEN SATURATION: 100 % | WEIGHT: 144 LBS | HEIGHT: 64 IN | HEART RATE: 95 BPM | DIASTOLIC BLOOD PRESSURE: 82 MMHG

## 2022-03-07 NOTE — REVIEW OF SYSTEMS
[Anal Pain: Grade 0] : Anal Pain: Grade 0 [Constipation: Grade 0] : Constipation: Grade 0 [Diarrhea: Grade 0] : Diarrhea: Grade 0 [Dyspepsia: Grade 0] : Dyspepsia: Grade 0 [Dysphagia: Grade 0] : Dysphagia: Grade 0 [Nausea: Grade 0] : Nausea: Grade 0 [Vomiting: Grade 0] : Vomiting: Grade 0 [Edema Limbs: Grade 0] : Edema Limbs: Grade 0  [Fatigue: Grade 0] : Fatigue: Grade 0 [Localized Edema: Grade 0] : Localized Edema: Grade 0  [Neck Edema: Grade 0] : Neck Edema: Grade 0 [Alopecia: Grade 0] : Alopecia: Grade 0 [Pruritus: Grade 0] : Pruritus: Grade 0 [Skin Atrophy: Grade 0] : Skin Atrophy: Grade 0 [Skin Hyperpigmentation: Grade 0] : Skin Hyperpigmentation: Grade 0 [Skin Induration: Grade 0] : Skin Induration: Grade 0 [Cough: Grade 0] : Cough: Grade 0 [Dyspnea: Grade 0] : Dyspnea: Grade 0 [Hiccups: Grade 0] : Hiccups: Grade 0 [Dermatitis Radiation: Grade 0] : Dermatitis Radiation: Grade 0

## 2022-03-07 NOTE — PHYSICAL EXAM
[Symmetric] : breasts are symmetric [Breast Abnormal Lactation (Galactorrhea)] : no nipple discharge [No UE Edema] : there is no upper extremity edema [Normal] : oriented to person, place and time, the affect was normal, the mood was normal and not anxious [de-identified] : erythema has progressed to a "tan."  No complaints or suspicious findings.

## 2022-03-07 NOTE — HISTORY OF PRESENT ILLNESS
[FreeTextEntry1] : Ms.Maureen Marshall has completed 15 Fx or 4005cGy to the Left partial breast from 01/19/2022 to 02/08/2022.\par \par 03/07/2022: PTE- Patient states she is doing well and ran a 5K marathon over the weekend.Seen by Hemeoncologist  last month, started on Tamoxifen (one pill a day). Denies pain or fatigue.No erythema noted on left breast.Patient states that her sister has been diagnosed with Ductal carcinoma (scheduled for bilateral mastectomy) and her brother with Basal skin cell carcinoma on left ear.Patient has questions regarding bilateral mastectomy as an option.\par \par 02/07/2022: FINAL OTV-  has completed 14/15 fx to the Left partial breast. Minimal redness noted on left breast. In addition, patient slipped and fell on her breast on Friday. Denies pain or fatigue. Continue RT.\par \par 01/31/2022: OTV-  has completed 9/15 fx to the Left partial breast.  She is doing well with minimal redness.  Her chief complaint is of fatigue, however she states it is not severe.  \par \par 01/24/2022: OTV-  has completed 4/15 fx to the left breast. Patient states she has no symptoms. No redness noted on left breast area. Compliant with Aquaphor. Continue RT.\par \par Cinda Marshall was seen today to discuss post-operative radiation for her breast cancer.   She is a 46 year old with a family history who had an abnormal screening mammogram in September.  This found a suspicious 7 mm nodule at 11N3 in the left breast.  On biopsy this proved to be malignant.  After discussion she elected breast conservation.  On 11/17/21 she underwent a local excision and sentinel node biopsy.  \par The final patholgoy:\par 0.8 cm, grade 2 lobular cancer.  Estrogen (95%) and progesterone (95%) receptors were positive.  HER2/damian was not amplified. \par All final margins were clear.  \par One sentinel node was negative (0/1) \par There was LCIS\par No genetic mutations\par Oncotype-DX score was 9, no chemo benefit (<1%), 3% distant recurrence risk at 9 yrs. \par \par \par MS.MAUREEN MARSHALL is a 46 year old female nulliparous female presenting initially with newly diagnosed LEFT BREAST CANCER 11:00 3fn 0.7 invasive mammary carcinoma with lobular features found on screening US. \par IHC: ER positive 90% DE positive 90% HER-2 negative, GRADE 2 with DCIS present. \par Paternal aunt- breast CA at 39. She denies palp mass or nipple discharge.\par ONCOTYPE RS of 9. TO START TAMOXIFEN AFTER RT.\par Patient presents with finding during breast ultrasound which picked up on the lesions. There were 2 benign lesion on the right breast and 1 malignant lesion on the left breast. Later a 2nd lesion on right breast was picked up by an MRI. \par PSH: Left breast Lumpectomy and [place] LN Biopsy done on 11/16/21\par \par 8/20/19 - Bilateral mammography and US - BIRAD 2\par \par 9/1/21: Bilateral screening mammograph/US: dense, LEFT 0.7x0.6x06 cm vertically oriented irregular mass @ 11:00 3cmfn which is suspicious in appearance - BIRAD 4 \par \par 9/29/21: Left US core biopsy - invasive mammary carcinoma with lobular features ER positive 90% DE positive 90% HER-2 negative, grade 2 with DCIS present. Heart clip. \par \par 10/14/21: MRI- Dense. L 0.8cm upper central clumped linear nonmass enhancement, bx proven malignancy. No evidence of skin, nipple or chest wall involvement. L 0.95cm central posterior, 4FN clumped linear nonmass enhancement (Rec. MRI guided bx). R 1.1cm central posterior, 3FN clumped nonmass enhancement (MRI bx rec.). R 11:00, 4FN clumped nonmass enhancement w/ adjacent mass (MR bx rec.) No adenopathy. BIRADS 4\par \par 10/18/21: L MRI guided bx for central enhancement- "Carlos" clip. LCIS and small IDP. Concordant.\par The clip is approximately 1 cm medial to the biopsy site which was located centrally in the breast\par \par 10/22/21: R MRI guided bx x2:\par SITE 1: R upper outer, 11:00 enhancement- "Cylinder" clip. Benign breast tissue w/ PASH. \par SITE 2: R central enhancement- "Barbell" clip. Fibrocystic changes. Concordant.\par \par 11/16/21: L NLOC lumpectomy and SLNS: 0.8cm ILC- clear margins, extensive LCIS, 0/1 LN negative for carcinoma \par \par 11/17/2021: PATHOLOGY REPORT \par 1)LEFT BREAST EXCISION:\par -Invasive lobular carcinoma, moderately differentiated.\par -Invasive carcinoma spans 0.8cm by microscopic measurement.\par -Extensive lobular carcinoma in situ (LCIS).\par Margins as present on this specimen\par   Lateral: Positive\par   Inferior: 4.5 mm\par   All remaining margins: >5mm\par -Biopsy site changes are present.\par \par 2)LEFT BREAST ANTERIOR INFERIOR MARGINS \par -Lobular carcinoma in situ (LCIS)\par \par TUMOR\par Histological Type: Invasive Lobular Carcinoma\par Glandular (Acinar)/Tubular Differentiation: Score 3\par Nuclear Pleomorphism: score 2\par Mitotic rate: score 1\par Overall grade: Grade 2 (scores 6 or 7)\par Tumor size: greatest dimension of largest invasive focus (mm) -8mm\par Tumor Focality:Single focus of invasive carcinoma\par Ductal Carcinoma in Situ (DCIS): Not present\par Lobular Carcinoma in Situ (LCIS): present, extensive.\par \par HEME ONCOLOGIST: \par BREAST SURGEON: DR.ROSENBAUM PARKER\par \par 12/15/2021: NEW CONSULT\par Patient was seen as a new consult. Patient is asymptomatic. Surgical incisions on left breast and left axilla. Educated on the procedure and side effects of RT and on the importance of having Aquaphor or Eucerin on the radiated site.

## 2022-03-07 NOTE — DISEASE MANAGEMENT
[Pathological] : TNM Stage: p [IA] : IA [TTNM] : 1b [NTNM] : 0 [MTNM] : 0 [de-identified] : 7262 [de-identified] : 3217 [de-identified] : LEFT Partial Breast

## 2022-03-22 ENCOUNTER — NON-APPOINTMENT (OUTPATIENT)
Age: 47
End: 2022-03-22

## 2022-03-28 ENCOUNTER — APPOINTMENT (OUTPATIENT)
Dept: HEMATOLOGY ONCOLOGY | Facility: CLINIC | Age: 47
End: 2022-03-28

## 2022-03-28 ENCOUNTER — OUTPATIENT (OUTPATIENT)
Dept: OUTPATIENT SERVICES | Facility: HOSPITAL | Age: 47
LOS: 1 days | End: 2022-03-28
Payer: COMMERCIAL

## 2022-03-28 ENCOUNTER — RESULT REVIEW (OUTPATIENT)
Age: 47
End: 2022-03-28

## 2022-03-28 PROCEDURE — 76642 ULTRASOUND BREAST LIMITED: CPT | Mod: 26,LT

## 2022-03-28 PROCEDURE — 76642 ULTRASOUND BREAST LIMITED: CPT

## 2022-03-28 NOTE — HISTORY OF PRESENT ILLNESS
[Disease: _____________________] : Disease: [unfilled] [T: ___] : T[unfilled] [N: ___] : N[unfilled] [M: ___] : M[unfilled] [AJCC Stage: ____] : AJCC Stage: [unfilled] [Treatment Protocol] : Treatment Protocol [de-identified] : 46F, with newly diagnosed left invasive lobular carcinoma s/p 11/16/21 left lumpectomy and SLNB with Dr. Romelia Berg, followed by adjuvant RT (1/19/22-2/8/22), who is currently on tamoxifen.\par \par OncHx:\par 9/1/21: BL screening mammo/US: dense, LEFT 0.7 x 0.6 x 06 cm vertically oriented irregular mass @ 11:00 3cmfn which is suspicious in appearance.\par  \par 9/29/21: L US core bx - invasive mammary carcinoma with lobular features ER positive 90% NH positive 90% HER-2 negative, grade 2 with DCIS present. Heart clip. \par \par 10/14/21: MRI- Dense. L 0.8cm upper central clumped linear nonmass enhancement, bx proven malignancy. No evidence of skin, nipple or chest wall involvement. L 0.95cm central posterior, 4FN clumped linear nonmass enhancement. R 1.1cm central posterior, 3FN clumped nonmass enhancement. R 11:00, 4FN clumped nonmass enhancement w/ adjacent mass. No adenopathy. \par \par 10/18/21: L MRI guided bx for central enhancement- "Carlos" clip. LCIS and small IDP. Concordant.\par the clip is approximately 1 cm medial to the biopsy site which was located centrally in the breast\par \par 10/22/21: R MRI guided bx x2:\par SITE 1: R upper outer, 11:00 enhancement- "Cylinder" clip. Benign breast tissue w/ PASH. \par SITE 2: R central enhancement- "Barbell" clip. Fibrocystic changes. Concordant.\par \par 11/16/21: L NLOC lumpectomy and SLNS: 0.8cm ILC- clear margins, extensive LCIS, 0/1 LN negative for carcinoma \par \par 1/19/22-2/8/22 4005 cGy to left partial breast.\par \par Paternal aunt- breast CA at 39. \par  [de-identified] : moderately differentiated invasive lobular carcinoma [de-identified] : ER+, IA+, HER2-, Oncotype RS 9 [de-identified] : Genetic testing negative [FreeTextEntry1] : Tamoxifen 2/2022 - present

## 2022-03-31 ENCOUNTER — NON-APPOINTMENT (OUTPATIENT)
Age: 47
End: 2022-03-31

## 2022-03-31 DIAGNOSIS — N64.59 OTHER SIGNS AND SYMPTOMS IN BREAST: ICD-10-CM

## 2022-04-05 ENCOUNTER — RESULT REVIEW (OUTPATIENT)
Age: 47
End: 2022-04-05

## 2022-04-06 ENCOUNTER — APPOINTMENT (OUTPATIENT)
Dept: ULTRASOUND IMAGING | Facility: HOSPITAL | Age: 47
End: 2022-04-06

## 2022-04-11 ENCOUNTER — NON-APPOINTMENT (OUTPATIENT)
Age: 47
End: 2022-04-11

## 2022-06-01 ENCOUNTER — NON-APPOINTMENT (OUTPATIENT)
Age: 47
End: 2022-06-01

## 2022-07-21 ENCOUNTER — APPOINTMENT (OUTPATIENT)
Dept: BREAST CENTER | Facility: CLINIC | Age: 47
End: 2022-07-21

## 2022-07-21 VITALS
BODY MASS INDEX: 24.24 KG/M2 | WEIGHT: 142 LBS | DIASTOLIC BLOOD PRESSURE: 85 MMHG | HEART RATE: 92 BPM | HEIGHT: 64 IN | SYSTOLIC BLOOD PRESSURE: 131 MMHG

## 2022-07-21 DIAGNOSIS — Z80.8 FAMILY HISTORY OF MALIGNANT NEOPLASM OF OTHER ORGANS OR SYSTEMS: ICD-10-CM

## 2022-07-21 DIAGNOSIS — Z78.9 OTHER SPECIFIED HEALTH STATUS: ICD-10-CM

## 2022-07-21 PROCEDURE — 99214 OFFICE O/P EST MOD 30 MIN: CPT

## 2022-08-04 ENCOUNTER — APPOINTMENT (OUTPATIENT)
Dept: RADIATION ONCOLOGY | Facility: CLINIC | Age: 47
End: 2022-08-04

## 2022-08-09 ENCOUNTER — APPOINTMENT (OUTPATIENT)
Dept: RADIATION ONCOLOGY | Facility: CLINIC | Age: 47
End: 2022-08-09

## 2022-08-11 ENCOUNTER — APPOINTMENT (OUTPATIENT)
Dept: RADIATION ONCOLOGY | Facility: CLINIC | Age: 47
End: 2022-08-11

## 2022-08-17 ENCOUNTER — NON-APPOINTMENT (OUTPATIENT)
Age: 47
End: 2022-08-17

## 2022-08-18 ENCOUNTER — APPOINTMENT (OUTPATIENT)
Dept: HEMATOLOGY ONCOLOGY | Facility: CLINIC | Age: 47
End: 2022-08-18

## 2022-08-18 ENCOUNTER — APPOINTMENT (OUTPATIENT)
Dept: RADIATION ONCOLOGY | Facility: CLINIC | Age: 47
End: 2022-08-18

## 2022-08-18 VITALS
WEIGHT: 141.6 LBS | RESPIRATION RATE: 16 BRPM | HEART RATE: 86 BPM | OXYGEN SATURATION: 100 % | SYSTOLIC BLOOD PRESSURE: 112 MMHG | DIASTOLIC BLOOD PRESSURE: 73 MMHG | BODY MASS INDEX: 24.17 KG/M2 | TEMPERATURE: 98.6 F | HEIGHT: 64 IN

## 2022-08-18 VITALS
RESPIRATION RATE: 18 BRPM | HEIGHT: 64 IN | WEIGHT: 140 LBS | HEART RATE: 112 BPM | BODY MASS INDEX: 23.9 KG/M2 | OXYGEN SATURATION: 100 % | DIASTOLIC BLOOD PRESSURE: 83 MMHG | SYSTOLIC BLOOD PRESSURE: 128 MMHG | TEMPERATURE: 96.5 F

## 2022-08-18 PROCEDURE — 99213 OFFICE O/P EST LOW 20 MIN: CPT

## 2022-08-18 NOTE — DISCUSSION/SUMMARY
[Cancer Type / Location / Histology Subtype: ________] : Cancer Type / Location / Histology Subtype: [unfilled] [Diagnosis Date (year): ____] : Diagnosis Date (year): [unfilled] [I] : I [Surgery] : Surgery: Yes [Surgery Date(s) (year): ____] : Surgery Date(s) (year): [unfilled] [Surgical Procedure / Location / Findings: _________] : Surgical Procedure / Location / Findings: [unfilled] [Radiation] : Radiation: Yes [Body Area Treated: _________] : Body Area Treated: [unfilled] [End Date (year): ____] : End Date (year): [unfilled] [Systemic Therapy (chemotherapy, hormonal therapy, other)] : Systemic Therapy (chemotherapy, hormonal therapy, other): Yes [Genetic / hereditary risk factor(s) or predisposing conditions: __________] : Genetic / hereditary risk factor(s) or predisposing conditions: [unfilled] [Need for onging (adjuvant) treatment for cancer?] : Need for onging (adjuvant) treatment for cancer? Yes [Follow up with Oncologist in _____] : Follow up with Oncologist in [unfilled] [Follow up with Surgeon in _____] : Follow up with Surgeon in [unfilled] [Follow up with Radiation MD in _____] : Follow up with Radiation MD in [unfilled] [Scans: ______] : Scans: [unfilled] [Primary care physician] : primary care physician [Mammogram] : Mammogram [PAP Test] : PAP test [Annual Flu Shot] : annual flu shot [Genetic Counseling] : Genetic Counseling: No [FreeTextEntry1] : Tamoxifen 2/2022-present\par Oncotype RS 9 [FreeTextEntry2] : Tamoxifen for minimum of 5 years [FreeTextEntry7] : Breast cancer support group [phone]\par [facility] cancer care program [email or web address] [FreeTextEntry8] : Dixie Bach, NP [FreeTextEntry9] : 8/18/22

## 2022-08-18 NOTE — HISTORY OF PRESENT ILLNESS
[Disease: _____________________] : Disease: [unfilled] [T: ___] : T[unfilled] [N: ___] : N[unfilled] [M: ___] : M[unfilled] [AJCC Stage: ____] : AJCC Stage: [unfilled] [Treatment Protocol] : Treatment Protocol [de-identified] : 46F, with newly diagnosed left invasive lobular carcinoma s/p 11/16/21 left lumpectomy and SLNB, was referred by Dr. Romelia Berg for adjuvant therapy.  She completed 4005 cGy to the left partial breast (1/19/22-2/8/22).  She has been on tamoxifen since 2/2022.\par \par OncHx:\par 9/1/21: BL screening mammo/US: dense, LEFT 0.7 x 0.6 x 06 cm vertically oriented irregular mass @ 11:00 3cmfn which is suspicious in appearance.\par  \par 9/29/21: L US core bx - invasive mammary carcinoma with lobular features ER positive 90% CT positive 90% HER-2 negative, grade 2 with DCIS present. Heart clip. \par \par 10/14/21: MRI- Dense. L 0.8cm upper central clumped linear nonmass enhancement, bx proven malignancy. No evidence of skin, nipple or chest wall involvement. L 0.95cm central posterior, 4FN clumped linear nonmass enhancement. R 1.1cm central posterior, 3FN clumped nonmass enhancement. R 11:00, 4FN clumped nonmass enhancement w/ adjacent mass. No adenopathy. \par \par 10/18/21: L MRI guided bx for central enhancement- "Carlos" clip. LCIS and small IDP. Concordant.\par the clip is approximately 1 cm medial to the biopsy site which was located centrally in the breast\par \par 10/22/21: R MRI guided bx x2:\par SITE 1: R upper outer, 11:00 enhancement- "Cylinder" clip. Benign breast tissue w/ PASH. \par SITE 2: R central enhancement- "Barbell" clip. Fibrocystic changes. Concordant.\par \par 11/16/21: L NLOC lumpectomy and SLNS: 0.8cm ILC- clear margins, extensive LCIS, 0/1 LN negative for carcinoma \par \par 1/19/22-2/8/22 4005 cGy to left partial breast.\par \par 3/28/22 L breast US: palpable oval complex cyst containing internal echoes in the left breast 3:00 (5N) measuring 29 x 17 x 22 mm.\par \par 4/5/22 left breast 3:00 5cmFN biopsy: breast tissue with cysts with surrounding inflammation and stromal fibrosis.\par \par 5/27/22 MRI breasts: Benign findings. No MR evidence of malignancy.  \par \par \par Paternal aunt- breast CA at 39. \par  [de-identified] : moderately differentiated invasive lobular carcinoma [de-identified] : ER+, OH+, HER2-, Oncotype RS 9 [de-identified] : Genetic testing negative [FreeTextEntry1] : Tamoxifen 2/2022-present [de-identified] : She felt a left lateral breast mass in March, underwent imaging and biopsy which revealed a cyst.  Recent imaging showed benign findings. She saw Dr. Romelia Berg on 7/21/22.  She is planning annual screening b/l mammo and US in September and MRI in May.  She had COVID 2 weeks ago with nasal congestion, feeling well now.  She has been physical active, running race.  She takes tamoxifen every night, tolerating well.  She missed her period in February and March but it has been regular since.  Sister was recently diagnosed with breast cancer at age 46 and had a b/l mastectomy.

## 2022-08-19 NOTE — DISEASE MANAGEMENT
[Pathological] : TNM Stage: p [IA] : IA [TTNM] : 1b [NTNM] : 0 [MTNM] : 0 [de-identified] : 7293 [de-identified] : 9727 [de-identified] : LEFT Partial Breast

## 2022-08-19 NOTE — PHYSICAL EXAM
[] : no respiratory distress [Breast Palpation Mass] : no palpable masses [Breast Abnormal Lactation (Galactorrhea)] : no nipple discharge [No UE Edema] : there is no upper extremity edema [Normal] : oriented to person, place and time, the affect was normal, the mood was normal and not anxious [Symmetric] : breasts are symmetric [de-identified] : Well-healed scar and minimal hyperpigmentation in the area of treatment field on the left, no palpable masses, lumps or lymphadenopathy on either side.

## 2022-08-19 NOTE — HISTORY OF PRESENT ILLNESS
[FreeTextEntry1] : Ms.Maureen Marshall has completed 15 Fx or 4005cGy to the Left partial breast from 01/19/2022 to 02/08/2022.\par \par 08/18/2022- RPA- Today she presents for follow up and states she overall feels well, denies fatigue. The LEFT breast/axilla area shows no appreciable symptomatology related to her adjuvant radiation therapy, without appreciable hyperpigmentation, erythema, and desquamation. LEFT breast/axilla area skin is clean, dry & intact. Pt denies any breast pain or pain otherwise and denies nipple discharge. No upper extremity edema noted. She continues on Tamoxifen with Dr. Montano, plans for f/u 8/18/22. On 7/21/22 she followed up with Dr. Romelia Berg  recent MRI done 5/27/22 BIRADS 2 and next mammogram/US planned for 9/1/22 and MRI planned for 5/1/23.\par \par Attending Attestation: Patient presents for a first follow-up since completing treatment to the left breast with APBI technique and posttreatment visit in February 2022.\par Overall, she is doing well.  She has excellent cosmetic outcome and is quite pleased with her current cosmesis.  There is minimal appreciated we will hyperpigmentation in the area of treatment which per the patient had already improved and is likely to continue improving.  On clinical exam, there are no palpable masses or lumps, there is no lymphadenopathy or any other concerning sign of recurrence.  Patient denied any new pain or nipple discharge.  She had completed her MRI late May 2022 with no concerning findings and her next mammogram with ultrasound is scheduled in early September 2022.  She will follow-up with her team as scheduled.  She is tolerating tamoxifen well and denied any new symptoms such as hot flashes or any other subjective concerns.\par \par Plan: Imaging as above, follow-up with her providers as scheduled, follow-up in this office in 6 months.  Patient amenable to this plan.  She had no further questions or concerns to share, provided with office contact number to reach out earlier if any new issues develop.\par \par Recent Imaging:\par _____________\par 3/28/22: L US- L 2.9cm oval complex cyst containing internal echoes 3:00 5FN (palpable). Rec therapeutic aspiration. BI-RADS 2\par 4/5/22: L US bx 3:00 (cork clip)- breast tissue with cysts with surrounding inflammation and stromal fibrosis (benign and concordant). Rec 6m f/u L MG/US\par 5/27/22: MRI- No MR evidence of malignancy. BI-RADS 2. \par \par \par \par 03/07/2022: PTE- Patient states she is doing well and ran a 5K marathon over the weekend.Seen by Hemeoncologist  last month, started on Tamoxifen (one pill a day). Denies pain or fatigue.No erythema noted on left breast.Patient states that her sister has been diagnosed with Ductal carcinoma (scheduled for bilateral mastectomy) and her brother with Basal skin cell carcinoma on left ear.Patient has questions regarding bilateral mastectomy as an option.\par \par 02/07/2022: FINAL OTV- Ms.Roche has completed 14/15 fx to the Left partial breast. Minimal redness noted on left breast. In addition, patient slipped and fell on her breast on Friday. Denies pain or fatigue. Continue RT.\par \par 01/31/2022: OTV- Ms.Roche has completed 9/15 fx to the Left partial breast.  She is doing well with minimal redness.  Her chief complaint is of fatigue, however she states it is not severe.  \par \par 01/24/2022: OTV- Ms.Roche has completed 4/15 fx to the left breast. Patient states she has no symptoms. No redness noted on left breast area. Compliant with Aquaphor. Continue RT.\par \par Cinda Marshall was seen today to discuss post-operative radiation for her breast cancer.   She is a 46 year old with a family history who had an abnormal screening mammogram in September.  This found a suspicious 7 mm nodule at 11N3 in the left breast.  On biopsy this proved to be malignant.  After discussion she elected breast conservation.  On 11/17/21 she underwent a local excision and sentinel node biopsy.  \par The final patholgoy:\par 0.8 cm, grade 2 lobular cancer.  Estrogen (95%) and progesterone (95%) receptors were positive.  HER2/damian was not amplified. \par All final margins were clear.  \par One sentinel node was negative (0/1) \par There was LCIS\par No genetic mutations\par Oncotype-DX score was 9, no chemo benefit (<1%), 3% distant recurrence risk at 9 yrs. \par \par \par MS.MAUREEN MARSHALL is a 46 year old female nulliparous female presenting initially with newly diagnosed LEFT BREAST CANCER 11:00 3fn 0.7 invasive mammary carcinoma with lobular features found on screening US. \par IHC: ER positive 90% TN positive 90% HER-2 negative, GRADE 2 with DCIS present. \par Paternal aunt- breast CA at 39. She denies palp mass or nipple discharge.\par ONCOTYPE RS of 9. TO START TAMOXIFEN AFTER RT.\par Patient presents with finding during breast ultrasound which picked up on the lesions. There were 2 benign lesion on the right breast and 1 malignant lesion on the left breast. Later a 2nd lesion on right breast was picked up by an MRI. \par PSH: Left breast Lumpectomy and [place] LN Biopsy done on 11/16/21\par \par 8/20/19 - Bilateral mammography and US - BIRAD 2\par \par 9/1/21: Bilateral screening mammograph/US: dense, LEFT 0.7x0.6x06 cm vertically oriented irregular mass @ 11:00 3cmfn which is suspicious in appearance - BIRAD 4 \par \par 9/29/21: Left US core biopsy - invasive mammary carcinoma with lobular features ER positive 90% TN positive 90% HER-2 negative, grade 2 with DCIS present. Heart clip. \par \par 10/14/21: MRI- Dense. L 0.8cm upper central clumped linear nonmass enhancement, bx proven malignancy. No evidence of skin, nipple or chest wall involvement. L 0.95cm central posterior, 4FN clumped linear nonmass enhancement (Rec. MRI guided bx). R 1.1cm central posterior, 3FN clumped nonmass enhancement (MRI bx rec.). R 11:00, 4FN clumped nonmass enhancement w/ adjacent mass (MR bx rec.) No adenopathy. BIRADS 4\par \par 10/18/21: L MRI guided bx for central enhancement- "Carlos" clip. LCIS and small IDP. Concordant.\par The clip is approximately 1 cm medial to the biopsy site which was located centrally in the breast\par \par 10/22/21: R MRI guided bx x2:\par SITE 1: R upper outer, 11:00 enhancement- "Cylinder" clip. Benign breast tissue w/ PASH. \par SITE 2: R central enhancement- "Barbell" clip. Fibrocystic changes. Concordant.\par \par 11/16/21: L NLOC lumpectomy and SLNS: 0.8cm ILC- clear margins, extensive LCIS, 0/1 LN negative for carcinoma \par \par 11/17/2021: PATHOLOGY REPORT \par 1)LEFT BREAST EXCISION:\par -Invasive lobular carcinoma, moderately differentiated.\par -Invasive carcinoma spans 0.8cm by microscopic measurement.\par -Extensive lobular carcinoma in situ (LCIS).\par Margins as present on this specimen\par   Lateral: Positive\par   Inferior: 4.5 mm\par   All remaining margins: >5mm\par -Biopsy site changes are present.\par \par 2)LEFT BREAST ANTERIOR INFERIOR MARGINS \par -Lobular carcinoma in situ (LCIS)\par \par TUMOR\par Histological Type: Invasive Lobular Carcinoma\par Glandular (Acinar)/Tubular Differentiation: Score 3\par Nuclear Pleomorphism: score 2\par Mitotic rate: score 1\par Overall grade: Grade 2 (scores 6 or 7)\par Tumor size: greatest dimension of largest invasive focus (mm) -8mm\par Tumor Focality:Single focus of invasive carcinoma\par Ductal Carcinoma in Situ (DCIS): Not present\par Lobular Carcinoma in Situ (LCIS): present, extensive.\par \par HEME ONCOLOGIST: \par BREAST SURGEON: DR.ROSENBAUM BERG\par \par 12/15/2021: NEW CONSULT\par Patient was seen as a new consult. Patient is asymptomatic. Surgical incisions on left breast and left axilla. Educated on the procedure and side effects of RT and on the importance of having Aquaphor or Eucerin on the radiated site.

## 2022-08-31 ENCOUNTER — APPOINTMENT (OUTPATIENT)
Dept: OBGYN | Facility: CLINIC | Age: 47
End: 2022-08-31

## 2022-08-31 VITALS
HEIGHT: 64 IN | HEART RATE: 85 BPM | DIASTOLIC BLOOD PRESSURE: 85 MMHG | WEIGHT: 136 LBS | SYSTOLIC BLOOD PRESSURE: 135 MMHG | BODY MASS INDEX: 23.22 KG/M2

## 2022-08-31 PROCEDURE — 99386 PREV VISIT NEW AGE 40-64: CPT

## 2022-08-31 NOTE — REVIEW OF SYSTEMS
[Anxiety] : anxiety [Negative] : Heme/Lymph [FreeTextEntry2] : got covid vaccine, had covid in august [de-identified] : hx of breast ca this past yr, radiation [de-identified] : hx of zoloft in past

## 2022-08-31 NOTE — PLAN
[FreeTextEntry1] : annual :pap and hpv\par \par s/p breast ca treatment stage 1\par doing well\par \par on tamoxifen, menses regular and 3 days\par recommend f/u in 6 months and I will check uterus, told to monitor menses closely\par \par s/a in past with female partners, d/w pt lubrication issues

## 2022-08-31 NOTE — HISTORY OF PRESENT ILLNESS
[Patient reported mammogram was abnormal] : Patient reported mammogram was abnormal [Patient reported breast sonogram was abnormal] : Patient reported breast sonogram was abnormal [Patient reported PAP Smear was normal] : Patient reported PAP Smear was normal [N] : Patient denies prior pregnancies [Normal Amount/Duration] :  normal amount and duration [Regular Cycle Intervals] : periods have been regular [Previously active] : previously active [Women] : women [TextBox_19] : Breast Cancer in 10/5/2021 [LMPDate] : 08/21/2022 [MensesFreq] : 28 [MensesLength] : 3 [PGHxTotal] : 0 [PGHxFullTerm] : 0 [PGHxPremature] : 0 [PGHxAbortions] : 0 [[facility]] : 0 [PGHxABInduced] : 0 [PGHxABSpont] : 0 [PGHxEctopic] : 0 [PGHxMultBirths] : 0 [FreeTextEntry1] : 8/21/22

## 2022-08-31 NOTE — PHYSICAL EXAM
[Chaperone Present] : A chaperone was present in the examining room during all aspects of the physical examination [Appropriately responsive] : appropriately responsive [Alert] : alert [No Acute Distress] : no acute distress [No Lymphadenopathy] : no lymphadenopathy [Regular Rate Rhythm] : regular rate rhythm [Clear to Auscultation B/L] : clear to auscultation bilaterally [Soft] : soft [Non-tender] : non-tender [Non-distended] : non-distended [No Mass] : no mass [Oriented x3] : oriented x3 [FreeTextEntry6] : lumpectomy scar well healed,  [Examination Of The Breasts] : a normal appearance [No Masses] : no breast masses were palpable [Labia Majora] : normal [Labia Minora] : normal [Normal] : normal [Normal Position] : in a normal position [Uterine Adnexae] : normal

## 2022-09-02 LAB — HPV HIGH+LOW RISK DNA PNL CVX: NOT DETECTED

## 2022-09-08 ENCOUNTER — TRANSCRIPTION ENCOUNTER (OUTPATIENT)
Age: 47
End: 2022-09-08

## 2022-09-08 LAB — CYTOLOGY CVX/VAG DOC THIN PREP: NORMAL

## 2023-01-03 ENCOUNTER — APPOINTMENT (OUTPATIENT)
Dept: OBGYN | Facility: CLINIC | Age: 48
End: 2023-01-03
Payer: COMMERCIAL

## 2023-01-03 PROCEDURE — 36415 COLL VENOUS BLD VENIPUNCTURE: CPT

## 2023-01-10 LAB
ESTRADIOL SERPL-MCNC: 86 PG/ML
FSH SERPL-MCNC: 13.2 IU/L
PROGEST SERPL-MCNC: 0.2 NG/ML

## 2023-02-16 ENCOUNTER — APPOINTMENT (OUTPATIENT)
Dept: HEMATOLOGY ONCOLOGY | Facility: CLINIC | Age: 48
End: 2023-02-16
Payer: COMMERCIAL

## 2023-02-16 VITALS
RESPIRATION RATE: 18 BRPM | BODY MASS INDEX: 23.9 KG/M2 | HEART RATE: 111 BPM | WEIGHT: 140 LBS | OXYGEN SATURATION: 100 % | DIASTOLIC BLOOD PRESSURE: 82 MMHG | HEIGHT: 64 IN | TEMPERATURE: 97.6 F | SYSTOLIC BLOOD PRESSURE: 123 MMHG

## 2023-02-16 PROCEDURE — 99214 OFFICE O/P EST MOD 30 MIN: CPT

## 2023-02-16 NOTE — REVIEW OF SYSTEMS
[Negative] : Allergic/Immunologic [FreeTextEntry3] : no vision changes [de-identified] : mood lability for several months, resolved

## 2023-02-16 NOTE — PHYSICAL EXAM
[Normal] : affect appropriate [de-identified] : Left breast lumpectomy scar is well healed. No mass palpated b/l

## 2023-02-16 NOTE — HISTORY OF PRESENT ILLNESS
[Disease: _____________________] : Disease: [unfilled] [T: ___] : T[unfilled] [N: ___] : N[unfilled] [M: ___] : M[unfilled] [AJCC Stage: ____] : AJCC Stage: [unfilled] [Treatment Protocol] : Treatment Protocol [de-identified] : 47F, with hx left invasive lobular carcinoma s/p 11/16/21 left lumpectomy and SLNB, adjuvant RT 4005 cGy to the left partial breast (1/19/22-2/8/22).  She has been on tamoxifen since 2/2022.\par \par OncHx:\par 9/1/21: BL screening mammo/US: dense, LEFT 0.7 x 0.6 x 06 cm vertically oriented irregular mass @ 11:00 3cmfn which is suspicious in appearance.\par  \par 9/29/21: L US core bx - invasive mammary carcinoma with lobular features ER positive 90% MA positive 90% HER-2 negative, grade 2 with DCIS present. Heart clip. \par \par 10/14/21: MRI- Dense. L 0.8cm upper central clumped linear nonmass enhancement, bx proven malignancy. No evidence of skin, nipple or chest wall involvement. L 0.95cm central posterior, 4FN clumped linear nonmass enhancement. R 1.1cm central posterior, 3FN clumped nonmass enhancement. R 11:00, 4FN clumped nonmass enhancement w/ adjacent mass. No adenopathy. \par \par 10/18/21: L MRI guided bx for central enhancement- "Carlos" clip. LCIS and small IDP. Concordant.\par the clip is approximately 1 cm medial to the biopsy site which was located centrally in the breast\par \par 10/22/21: R MRI guided bx x2:\par SITE 1: R upper outer, 11:00 enhancement- "Cylinder" clip. Benign breast tissue w/ PASH. \par SITE 2: R central enhancement- "Barbell" clip. Fibrocystic changes. Concordant.\par \par 11/16/21: L NLOC lumpectomy and SLNS: 0.8cm ILC- clear margins, extensive LCIS, 0/1 LN negative for carcinoma \par \par 1/19/22-2/8/22 4005 cGy to left partial breast.\par \par 3/28/22 L breast US: palpable oval complex cyst containing internal echoes in the left breast 3:00 (5N) measuring 29 x 17 x 22 mm.\par \par 4/5/22 left breast 3:00 5cmFN biopsy: breast tissue with cysts with surrounding inflammation and stromal fibrosis.\par \par 5/27/22 MRI breasts: Benign findings. No MR evidence of malignancy.  \par \par 9/1/22 b/l mammo/US: Benign findings.  Status post interval left breast lumpectomy with new postsurgical change at 12:00 axis.  Status post interval left breast US core biopsy yielding significant decrease in the left 3:00 complex cyst.  Incidental b/l breast cysts.\par \par Paternal aunt- breast CA at 39. \par  [de-identified] : moderately differentiated invasive lobular carcinoma [de-identified] : ER+, AZ+, HER2-, Oncotype RS 9 [de-identified] : Genetic testing negative [FreeTextEntry1] : Tamoxifen 2/2022-present [de-identified] : She is taking tamoxifen every night.  She had mood lability for several months, but subsided.  She felt bilateral breast discomfort in December for about a week which resolved.  She has followed with her gyn.  She skipped her menses December and January, had it this month. Denies any new breast masses or discharge.  She ran a half marathon in [facility], continues to train.  Recent bloodwork is normal.

## 2023-02-21 ENCOUNTER — APPOINTMENT (OUTPATIENT)
Dept: RADIATION ONCOLOGY | Facility: CLINIC | Age: 48
End: 2023-02-21

## 2023-02-24 ENCOUNTER — APPOINTMENT (OUTPATIENT)
Dept: OBGYN | Facility: CLINIC | Age: 48
End: 2023-02-24
Payer: COMMERCIAL

## 2023-02-24 VITALS
HEART RATE: 86 BPM | WEIGHT: 140 LBS | HEIGHT: 64 IN | SYSTOLIC BLOOD PRESSURE: 122 MMHG | DIASTOLIC BLOOD PRESSURE: 78 MMHG | BODY MASS INDEX: 23.9 KG/M2

## 2023-02-24 DIAGNOSIS — R39.9 UNSPECIFIED SYMPTOMS AND SIGNS INVOLVING THE GENITOURINARY SYSTEM: ICD-10-CM

## 2023-02-24 PROCEDURE — 99213 OFFICE O/P EST LOW 20 MIN: CPT

## 2023-02-27 ENCOUNTER — TRANSCRIPTION ENCOUNTER (OUTPATIENT)
Age: 48
End: 2023-02-27

## 2023-02-27 LAB — BACTERIA UR CULT: NORMAL

## 2023-02-27 NOTE — PROCEDURE
[Transvaginal Ultrasound] : transvaginal ultrasound [Anteverted] : anteverted [FreeTextEntry9] : on tamoxifen [FreeTextEntry3] : thin homogeneous endometrial lining 4 mm\par normal uterus contour and shape [FreeTextEntry4] : normal pelvic sono

## 2023-02-27 NOTE — PLAN
[FreeTextEntry1] : cycles irregular but did get in November and then beginning this month, normal flow 3 days\par FSH done in Jan normal\par d/w pt I suspect in perimenopause\par \par *on tamoxifen: here to check uterine lining.pelvic sono all reassuring, thin EE\par f/u in 6 months.

## 2023-02-27 NOTE — PHYSICAL EXAM
[Appropriately responsive] : appropriately responsive [Alert] : alert [No Acute Distress] : no acute distress [Soft] : soft [Non-tender] : non-tender [Non-distended] : non-distended [No Mass] : no mass [Labia Majora] : normal [Labia Minora] : normal [Normal] : normal [Uterine Adnexae] : normal

## 2023-02-27 NOTE — HISTORY OF PRESENT ILLNESS
[Y] : Patient is sexually active [N] : Patient denies prior pregnancies [FreeTextEntry1] : Patient presents for follow up due to being on tamoxifen [LMPDate] : 02/03/2023 [TextBox_6] : 2/3/23 [TextBox_11] : irregular cycles, skipped DEC and Jan , did get cycle 2.3.22

## 2023-03-01 ENCOUNTER — APPOINTMENT (OUTPATIENT)
Dept: GASTROENTEROLOGY | Facility: CLINIC | Age: 48
End: 2023-03-01
Payer: COMMERCIAL

## 2023-03-01 DIAGNOSIS — Z12.11 ENCOUNTER FOR SCREENING FOR MALIGNANT NEOPLASM OF COLON: ICD-10-CM

## 2023-03-01 PROCEDURE — 99202 OFFICE O/P NEW SF 15 MIN: CPT | Mod: 95

## 2023-03-02 NOTE — ASSESSMENT
[FreeTextEntry1] : Average risk index screening colonoscopy\par \par Schedule colonoscopy at [facility]\par Miralax Bowel prep provided\par r/b/a/i discussed and patient agreeable\par Details of procedure, including risks of procedure which include but not limited to bleeding, perforation, infection, missed polyp discussed and patient gives verbal consent. Written consent to be obtained at time of procedure.\par Pt was advised that an escort is needed to  from procedure\par Will f/u post procedure\par

## 2023-03-02 NOTE — HISTORY OF PRESENT ILLNESS
[FreeTextEntry1] : 47F y/o F with pmhx left invasive lobular carcinoma dx 10/2021 s/p  lumpectomy and RT, right ovarian torsion w/oopherectomy presents for colon cancer screening.  She has been on tamoxifen since 2/2022. Pt is following with Dr. Montano heme-onc. Denies any changes in bowel habits. Reports daily regular formed BM without blood nor mucus in stool. Denies abnormal weight loss or lack of appetite. \par \par Negative BRACA\par Estrogen driven breast cancer\par PSHX: right ovarian torsion 2016\par Supplements: Vit D\par Allergies: NKDA\par FH: Paternal grandfather w/ colon cancer, Paternal aunt with breast cancer, sister with breast cancer\par SH: Denies tobacco, occasional alcohol use, denies marijuana use\par Diet: Vegetables, fruits, minimal meat\par Exercise: Walking, lifting, exercise 5x week\par Occupation: Occupational therapist\par \par 10/18/21 path report: Final Diagnosis\par Breast, left, central, enhancement; biopsy:\par - Lobular carcinoma in situ (LCIS)\par - Small intraductal papilloma, columnar cell and fibrocystic changes\par - Calcifications associated with LCIS\par

## 2023-03-02 NOTE — REASON FOR VISIT
[Initial Evaluation] : an initial evaluation [Home] : at home, [unfilled] , at the time of the visit. [Medical Office: ([facility])___] : at the medical office located in  [Patient] : the patient [FreeTextEntry1] : CRC screening

## 2023-03-21 ENCOUNTER — APPOINTMENT (OUTPATIENT)
Dept: RADIATION ONCOLOGY | Facility: CLINIC | Age: 48
End: 2023-03-21
Payer: COMMERCIAL

## 2023-03-21 VITALS
WEIGHT: 143.2 LBS | RESPIRATION RATE: 16 BRPM | TEMPERATURE: 98.5 F | OXYGEN SATURATION: 100 % | HEART RATE: 100 BPM | DIASTOLIC BLOOD PRESSURE: 83 MMHG | SYSTOLIC BLOOD PRESSURE: 113 MMHG | BODY MASS INDEX: 24.58 KG/M2

## 2023-03-21 PROCEDURE — 99215 OFFICE O/P EST HI 40 MIN: CPT

## 2023-03-21 NOTE — VITALS
[Maximal Pain Intensity: 0/10] : 0/10 [NoTreatment Scheduled] : no treatment scheduled [100: Normal, no complaints, no evidence of disease.] : 100: Normal, no complaints, no evidence of disease. [ECOG Performance Status: 0 - Fully active, able to carry on all pre-disease performance without restriction] : Performance Status: 0 - Fully active, able to carry on all pre-disease performance without restriction

## 2023-04-01 NOTE — PHYSICAL EXAM
[Outer Ear] : the ears and nose were normal in appearance [Hearing Threshold Finger Rub Not Levy] : hearing was normal [] : no respiratory distress [Exaggerated Use Of Accessory Muscles For Inspiration] : no accessory muscle use [Nondistended] : nondistended [Supraclavicular Lymph Nodes Enlarged Bilaterally] : supraclavicular [Axillary Lymph Nodes Enlarged Bilaterally] : axillary [Normal] : oriented to person, place and time, the affect was normal, the mood was normal and not anxious [de-identified] : Well-healed lumpectomy scars. No skin toxicity or lesions. Minimal post-tx change, symmetric breasts

## 2023-04-01 NOTE — HISTORY OF PRESENT ILLNESS
[FreeTextEntry1] : Ms.Maureen Marshall has completed 15 Fx or 4005cGy to the Left partial breast from 01/19/2022 to 02/08/2022.\par \par 3/21/2023 - RPA - patient presents for follow up. Reports feeling well overall - has concerns about nipple chaffing during exercise. Continues on Tamoxifen with Dr. Montano and tolerating well with exception of effect on period (irregularity). States she will complete US/MMG in May.\par \par 08/18/2022- RPA- Today she presents for follow up and states she overall feels well, denies fatigue. The LEFT breast/axilla area shows no appreciable symptomatology related to her adjuvant radiation therapy, without appreciable hyperpigmentation, erythema, and desquamation. LEFT breast/axilla area skin is clean, dry & intact. Pt denies any breast pain or pain otherwise and denies nipple discharge. No upper extremity edema noted. She continues on Tamoxifen with Dr. Montano, plans for f/u 8/18/22. On 7/21/22 she followed up with Dr. Romelia Berg  recent MRI done 5/27/22 BIRADS 2 and next mammogram/US planned for 9/1/22 and MRI planned for 5/1/23.\par \par Attending Attestation: Patient presents for a first follow-up since completing treatment to the left breast with APBI technique and posttreatment visit in February 2022.\par Overall, she is doing well.  She has excellent cosmetic outcome and is quite pleased with her current cosmesis.  There is minimal appreciated we will hyperpigmentation in the area of treatment which per the patient had already improved and is likely to continue improving.  On clinical exam, there are no palpable masses or lumps, there is no lymphadenopathy or any other concerning sign of recurrence.  Patient denied any new pain or nipple discharge.  She had completed her MRI late May 2022 with no concerning findings and her next mammogram with ultrasound is scheduled in early September 2022.  She will follow-up with her team as scheduled.  She is tolerating tamoxifen well and denied any new symptoms such as hot flashes or any other subjective concerns.\par \par Plan: Imaging as above, follow-up with her providers as scheduled, follow-up in this office in 6 months.  Patient amenable to this plan.  She had no further questions or concerns to share, provided with office contact number to reach out earlier if any new issues develop.\par \par Recent Imaging:\par _____________\par 3/28/22: L US- L 2.9cm oval complex cyst containing internal echoes 3:00 5FN (palpable). Rec therapeutic aspiration. BI-RADS 2\par 4/5/22: L US bx 3:00 (cork clip)- breast tissue with cysts with surrounding inflammation and stromal fibrosis (benign and concordant). Rec 6m f/u L MG/US\par 5/27/22: MRI- No MR evidence of malignancy. BI-RADS 2. \par \par \par \par 03/07/2022: PTE- Patient states she is doing well and ran a 5K marathon over the weekend.Seen by Hemeoncologist  last month, started on Tamoxifen (one pill a day). Denies pain or fatigue.No erythema noted on left breast.Patient states that her sister has been diagnosed with Ductal carcinoma (scheduled for bilateral mastectomy) and her brother with Basal skin cell carcinoma on left ear.Patient has questions regarding bilateral mastectomy as an option.\par \par 02/07/2022: FINAL OTV-  has completed 14/15 fx to the Left partial breast. Minimal redness noted on left breast. In addition, patient slipped and fell on her breast on Friday. Denies pain or fatigue. Continue RT.\par \par 01/31/2022: OTV-  has completed 9/15 fx to the Left partial breast.  She is doing well with minimal redness.  Her chief complaint is of fatigue, however she states it is not severe.  \par \par 01/24/2022: OTV-  has completed 4/15 fx to the left breast. Patient states she has no symptoms. No redness noted on left breast area. Compliant with Aquaphor. Continue RT.\par \par Cinda Marshall was seen today to discuss post-operative radiation for her breast cancer.   She is a 46 year old with a family history who had an abnormal screening mammogram in September.  This found a suspicious 7 mm nodule at 11N3 in the left breast.  On biopsy this proved to be malignant.  After discussion she elected breast conservation.  On 11/17/21 she underwent a local excision and sentinel node biopsy.  \par The final patholgoy:\par 0.8 cm, grade 2 lobular cancer.  Estrogen (95%) and progesterone (95%) receptors were positive.  HER2/damian was not amplified. \par All final margins were clear.  \par One sentinel node was negative (0/1) \par There was LCIS\par No genetic mutations\par Oncotype-DX score was 9, no chemo benefit (<1%), 3% distant recurrence risk at 9 yrs. \par \par \par MS.MAUREEN MARSHALL is a 46 year old female nulliparous female presenting initially with newly diagnosed LEFT BREAST CANCER 11:00 3fn 0.7 invasive mammary carcinoma with lobular features found on screening US. \par IHC: ER positive 90% CT positive 90% HER-2 negative, GRADE 2 with DCIS present. \par Paternal aunt- breast CA at 39. She denies palp mass or nipple discharge.\par ONCOTYPE RS of 9. TO START TAMOXIFEN AFTER RT.\par Patient presents with finding during breast ultrasound which picked up on the lesions. There were 2 benign lesion on the right breast and 1 malignant lesion on the left breast. Later a 2nd lesion on right breast was picked up by an MRI. \par PSH: Left breast Lumpectomy and Nelson LN Biopsy done on 11/16/21\par \par 8/20/19 - Bilateral mammography and US - BIRAD 2\par \par 9/1/21: Bilateral screening mammograph/US: dense, LEFT 0.7x0.6x06 cm vertically oriented irregular mass @ 11:00 3cmfn which is suspicious in appearance - BIRAD 4 \par \par 9/29/21: Left US core biopsy - invasive mammary carcinoma with lobular features ER positive 90% CT positive 90% HER-2 negative, grade 2 with DCIS present. Heart clip. \par \par 10/14/21: MRI- Dense. L 0.8cm upper central clumped linear nonmass enhancement, bx proven malignancy. No evidence of skin, nipple or chest wall involvement. L 0.95cm central posterior, 4FN clumped linear nonmass enhancement (Rec. MRI guided bx). R 1.1cm central posterior, 3FN clumped nonmass enhancement (MRI bx rec.). R 11:00, 4FN clumped nonmass enhancement w/ adjacent mass (MR bx rec.) No adenopathy. BIRADS 4\par \par 10/18/21: L MRI guided bx for central enhancement- "Carlos" clip. LCIS and small IDP. Concordant.\par The clip is approximately 1 cm medial to the biopsy site which was located centrally in the breast\par \par 10/22/21: R MRI guided bx x2:\par SITE 1: R upper outer, 11:00 enhancement- "Cylinder" clip. Benign breast tissue w/ PASH. \par SITE 2: R central enhancement- "Barbell" clip. Fibrocystic changes. Concordant.\par \par 11/16/21: L NLOC lumpectomy and SLNS: 0.8cm ILC- clear margins, extensive LCIS, 0/1 LN negative for carcinoma \par \par 11/17/2021: PATHOLOGY REPORT \par 1)LEFT BREAST EXCISION:\par -Invasive lobular carcinoma, moderately differentiated.\par -Invasive carcinoma spans 0.8cm by microscopic measurement.\par -Extensive lobular carcinoma in situ (LCIS).\par Margins as present on this specimen\par   Lateral: Positive\par   Inferior: 4.5 mm\par   All remaining margins: >5mm\par -Biopsy site changes are present.\par \par 2)LEFT BREAST ANTERIOR INFERIOR MARGINS \par -Lobular carcinoma in situ (LCIS)\par \par TUMOR\par Histological Type: Invasive Lobular Carcinoma\par Glandular (Acinar)/Tubular Differentiation: Score 3\par Nuclear Pleomorphism: score 2\par Mitotic rate: score 1\par Overall grade: Grade 2 (scores 6 or 7)\par Tumor size: greatest dimension of largest invasive focus (mm) -8mm\par Tumor Focality:Single focus of invasive carcinoma\par Ductal Carcinoma in Situ (DCIS): Not present\par Lobular Carcinoma in Situ (LCIS): present, extensive.\par \par HEME ONCOLOGIST: \par BREAST SURGEON: DR.ROSENBAUM BERG\par \par 12/15/2021: NEW CONSULT\par Patient was seen as a new consult. Patient is asymptomatic. Surgical incisions on left breast and left axilla. Educated on the procedure and side effects of RT and on the importance of having Aquaphor or Eucerin on the radiated site.

## 2023-04-01 NOTE — DISEASE MANAGEMENT
[Pathological] : TNM Stage: p [IA] : IA [TTNM] : 1b [NTNM] : 0 [MTNM] : 0 [de-identified] : 0393 [de-identified] : 9228 [de-identified] : LEFT Partial Breast

## 2023-05-17 ENCOUNTER — APPOINTMENT (OUTPATIENT)
Dept: BREAST CENTER | Facility: CLINIC | Age: 48
End: 2023-05-17
Payer: COMMERCIAL

## 2023-05-17 VITALS
SYSTOLIC BLOOD PRESSURE: 126 MMHG | HEIGHT: 64 IN | BODY MASS INDEX: 23.9 KG/M2 | DIASTOLIC BLOOD PRESSURE: 84 MMHG | HEART RATE: 101 BPM | WEIGHT: 140 LBS

## 2023-05-17 PROCEDURE — 99213 OFFICE O/P EST LOW 20 MIN: CPT

## 2023-05-17 NOTE — HISTORY OF PRESENT ILLNESS
[FreeTextEntry1] : Patient is a 46yo F here for breast cancer surveillance. Hx of LEFT NLOC lumpectomy & SNB on 11/16/21 (age 46) yielding 0.8cm ILC (ER+/[place]+/HER2-) with clear margins, extensive LCIS, 0/1 LN. Oncotype 9. S/p RT (completed 2/2022, Dr. Guillermo). Currently on Tamoxifen (Dr. Montano). Patient is also s/p b/l MRI guided bx 10/2021 yielding R benign tissue and L LCIS with minute papilloma. Fhx of breast cancer in paternal aunt (age 39).  Patient is BRCA/full panel negative (tested in 2021). She denies palp mass or nipple discharge.\par \par TNM Stage: p T1b, pN0, pMX \par AJCC Stage (8th Ed): IA\par \par 8/20/19: B/l MG & US- BIRAD 2\par 9/1/21: B/l MG & US- dense,  LEFT 0.7x0.6x06 cm vertically oriented irregular mass @ 11:00 3cmfn which is suspicious in appearance - BIRAD 4 \par 9/29/21: L US core bx- invasive mammary carcinoma with lobular features  ER positive 90% [place] positive 90% HER-2 negative, grade 2 with DCIS present. Heart clip. \par 10/14/21: MRI- Dense. L 0.8cm upper central clumped linear nonmass enhancement, bx proven malignancy. No evidence of skin, nipple or chest wall involvement. L 0.95cm central posterior, 4FN clumped linear nonmass enhancement (Rec. MRI guided bx). R 1.1cm central posterior, 3FN clumped nonmass enhancement (MRI bx rec.). R 11:00, 4FN clumped nonmass enhancement w/ adjacent mass (MR bx rec.) No adenopathy. BIRADS 4\par 10/18/21: L MRI guided bx for central enhancement- "Carlos" clip. LCIS and small IDP. Concordant. The clip is approximately 1 cm medial to the biopsy site which was located centrally in the breast\par 10/22/21: R MRI guided bx x2: SITE 1: R upper outer, 11:00 enhancement- "Cylinder" clip. Benign breast tissue w/ PASH. SITE 2: R central enhancement- "Barbell" clip. Fibrocystic changes. Concordant.\par 11/16/21: L NLOC lumpectomy and SLNS: 0.8cm ILC- clear margins, extensive LCIS, 0/1 LN negative for carcinoma\par 3/28/22: L US- L 2.9cm oval complex cyst containing internal echoes 3:00 5FN (palpable). Rec therapeutic aspiration. BI-RADS 2\par 4/5/22: L US bx 3:00 (cork clip)- breast tissue with cysts with surrounding inflammation and stromal fibrosis (benign and concordant). Rec 6m f/u L MG/US\par 5/27/22: MRI- No MR evidence of malignancy. BI-RADS 2\par 9/1/22: B/l MG & US- heterogenously dense. B/l bx clips. US- B/l numerous cysts. BI-RADS 2\par 5/17/23: MRI- MASSIEL. BI-RADS 2

## 2023-05-17 NOTE — PHYSICAL EXAM
[Normocephalic] : normocephalic [EOMI] : extra ocular movement intact [Supple] : supple [No Supraclavicular Adenopathy] : no supraclavicular adenopathy [No Cervical Adenopathy] : no cervical adenopathy [de-identified] : R breast/axilla/supraclavicular area: No masses, discharge, or adenopathy\par  [de-identified] : L breast/axilla/supraclavicular area-no evidence of recurrence

## 2023-05-17 NOTE — PAST MEDICAL HISTORY
[Menstruating] : The patient is menstruating [Menarche Age ____] : age at menarche was [unfilled] [Regular Cycle Intervals] : have been regular [Total Preg ___] : G[unfilled] [Definite ___ (Date)] : the last menstrual period was [unfilled] [History of Hormone Replacement Treatment] : has no history of hormone replacement treatment [FreeTextEntry5] : laparoscopic removal of right ovary and fallopian tbe [FreeTextEntry6] : n/a [FreeTextEntry7] : up until 10/5/21 [FreeTextEntry8] : n/a

## 2023-06-22 ENCOUNTER — NON-APPOINTMENT (OUTPATIENT)
Age: 48
End: 2023-06-22

## 2023-08-07 ENCOUNTER — APPOINTMENT (OUTPATIENT)
Dept: OBGYN | Facility: CLINIC | Age: 48
End: 2023-08-07

## 2023-08-07 VITALS
OXYGEN SATURATION: 100 % | SYSTOLIC BLOOD PRESSURE: 112 MMHG | BODY MASS INDEX: 24.07 KG/M2 | HEART RATE: 97 BPM | DIASTOLIC BLOOD PRESSURE: 63 MMHG | WEIGHT: 141 LBS | HEIGHT: 64 IN

## 2023-08-17 ENCOUNTER — APPOINTMENT (OUTPATIENT)
Age: 48
End: 2023-08-17
Payer: COMMERCIAL

## 2023-08-17 PROCEDURE — G0121 COLON CA SCRN NOT HI RSK IND: CPT

## 2023-08-28 ENCOUNTER — APPOINTMENT (OUTPATIENT)
Dept: HEMATOLOGY ONCOLOGY | Facility: CLINIC | Age: 48
End: 2023-08-28
Payer: COMMERCIAL

## 2023-08-28 VITALS
OXYGEN SATURATION: 100 % | WEIGHT: 141 LBS | HEIGHT: 64 IN | BODY MASS INDEX: 24.07 KG/M2 | DIASTOLIC BLOOD PRESSURE: 76 MMHG | RESPIRATION RATE: 18 BRPM | TEMPERATURE: 97.5 F | SYSTOLIC BLOOD PRESSURE: 118 MMHG | HEART RATE: 81 BPM

## 2023-08-28 PROCEDURE — 99213 OFFICE O/P EST LOW 20 MIN: CPT

## 2023-08-28 NOTE — REVIEW OF SYSTEMS
[Diarrhea: Grade 0] : Diarrhea: Grade 0 [Hot Flashes] : hot flashes [Negative] : Allergic/Immunologic

## 2023-08-28 NOTE — PHYSICAL EXAM
[Normal] : affect appropriate [de-identified] : Left breast lumpectomy scar is well healed. No mass palpated b/l

## 2023-08-28 NOTE — HISTORY OF PRESENT ILLNESS
[Disease: _____________________] : Disease: [unfilled] [T: ___] : T[unfilled] [N: ___] : N[unfilled] [M: ___] : M[unfilled] [AJCC Stage: ____] : AJCC Stage: [unfilled] [Treatment Protocol] : Treatment Protocol [de-identified] : 47F, with a history of left invasive lobular carcinoma s/p 11/16/21 left lumpectomy and SLNB, adjuvant RT 4005 cGy to the left partial breast (1/19/22-2/8/22).  She has been on tamoxifen since 2/2022.  Fhx breast cancer in sister (46) and paternal aunt (39).  OncHx: 9/1/21: BL screening mammo/US: dense, LEFT 0.7 x 0.6 x 06 cm vertically oriented irregular mass @ 11:00 3cmfn which is suspicious in appearance.   9/29/21: L US core bx - invasive mammary carcinoma with lobular features ER positive 90% [place] positive 90% HER-2 negative, grade 2 with DCIS present. Heart clip.   10/14/21: MRI- Dense. L 0.8cm upper central clumped linear nonmass enhancement, bx proven malignancy. No evidence of skin, nipple or chest wall involvement. L 0.95cm central posterior, 4FN clumped linear nonmass enhancement. R 1.1cm central posterior, 3FN clumped nonmass enhancement. R 11:00, 4FN clumped nonmass enhancement w/ adjacent mass. No adenopathy.   10/18/21: L MRI guided bx for central enhancement- "Carlos" clip. LCIS and small IDP. Concordant. the clip is approximately 1 cm medial to the biopsy site which was located centrally in the breast  10/22/21: R MRI guided bx x2: SITE 1: R upper outer, 11:00 enhancement- "Cylinder" clip. Benign breast tissue w/ PASH.  SITE 2: R central enhancement- "Barbell" clip. Fibrocystic changes. Concordant.  11/16/21: L NLOC lumpectomy and SLNS: 0.8cm ILC- clear margins, extensive LCIS, 0/1 LN negative for carcinoma   1/19/22-2/8/22 4005 cGy to left partial breast.  3/28/22 L breast US: palpable oval complex cyst containing internal echoes in the left breast 3:00 (5N) measuring 29 x 17 x 22 mm.  4/5/22 left breast 3:00 5cmFN biopsy: breast tissue with cysts with surrounding inflammation and stromal fibrosis.  5/27/22 MRI breasts: Benign findings. No MR evidence of malignancy.    9/1/22: B/l MG & US- heterogenously dense. B/l bx clips. US- B/l numerous cysts. BI-RADS 2  5/17/23: MRI- MASSIEL. BI-RADS 2 [de-identified] : moderately differentiated invasive lobular carcinoma [de-identified] : ER+, [place]+, HER2-, Oncotype RS 9 [de-identified] : Genetic testing negative [FreeTextEntry1] : Tamoxifen 2/2022-present [de-identified] : She has been physical active, running race.  She takes tamoxifen every night, tolerating well.  She saw gyn 2/24/23 for irregular cycles, pelvic sono ok, likely perimenopausal.  LMP in June.  Had screening colonoscopy 8/17/23, WNL.  She continues to train for marathon and is physically active.  She has occasional hot flashes.  Had routine labwork done, WNL.  Will repeat labwork in December.  She has mammo/US scheduled in October.

## 2023-09-05 ENCOUNTER — APPOINTMENT (OUTPATIENT)
Dept: OBGYN | Facility: CLINIC | Age: 48
End: 2023-09-05
Payer: COMMERCIAL

## 2023-09-05 VITALS
OXYGEN SATURATION: 99 % | WEIGHT: 143 LBS | SYSTOLIC BLOOD PRESSURE: 118 MMHG | HEART RATE: 90 BPM | BODY MASS INDEX: 24.55 KG/M2 | DIASTOLIC BLOOD PRESSURE: 77 MMHG

## 2023-09-05 DIAGNOSIS — Z01.419 ENCOUNTER FOR GYNECOLOGICAL EXAMINATION (GENERAL) (ROUTINE) W/OUT ABNORMAL FINDINGS: ICD-10-CM

## 2023-09-05 PROCEDURE — 99396 PREV VISIT EST AGE 40-64: CPT

## 2023-09-07 LAB — HPV HIGH+LOW RISK DNA PNL CVX: NOT DETECTED

## 2023-09-07 NOTE — HISTORY OF PRESENT ILLNESS
[Previously active] : previously active [Regular Cycle Intervals] : periods have been regular [FreeTextEntry1] : 06/16/2023

## 2023-09-07 NOTE — PLAN
[FreeTextEntry1] : annual: pap and hpv has f/u with DR Romelia gould and does breast imaging through her on tamoxifen: for q 6 month f/u, office sono of uterus reassuring today training for [facility]  sees Dr Montano onc

## 2023-09-07 NOTE — PHYSICAL EXAM
[Chaperone Present] : A chaperone was present in the examining room during all aspects of the physical examination [Appropriately responsive] : appropriately responsive [Alert] : alert [No Acute Distress] : no acute distress [No Lymphadenopathy] : no lymphadenopathy [Regular Rate Rhythm] : regular rate rhythm [No Murmurs] : no murmurs [Clear to Auscultation B/L] : clear to auscultation bilaterally [Soft] : soft [Non-tender] : non-tender [Non-distended] : non-distended [No HSM] : No HSM [No Lesions] : no lesions [No Mass] : no mass [Oriented x3] : oriented x3 [Examination Of The Breasts] : a normal appearance [No Masses] : no breast masses were palpable [Labia Majora] : normal [Labia Minora] : normal [Normal] : normal [Uterine Adnexae] : normal [FreeTextEntry6] :  pelvic sono also done to check em lining etc: grossly normal (less than 1 cm in thickness) normal uterus contour, pt on tamoxifen

## 2023-09-11 ENCOUNTER — TRANSCRIPTION ENCOUNTER (OUTPATIENT)
Age: 48
End: 2023-09-11

## 2023-09-11 LAB — CYTOLOGY CVX/VAG DOC THIN PREP: NORMAL

## 2023-09-26 ENCOUNTER — APPOINTMENT (OUTPATIENT)
Dept: RADIATION ONCOLOGY | Facility: CLINIC | Age: 48
End: 2023-09-26
Payer: COMMERCIAL

## 2023-09-26 VITALS
HEART RATE: 86 BPM | RESPIRATION RATE: 18 BRPM | HEIGHT: 64 IN | DIASTOLIC BLOOD PRESSURE: 86 MMHG | WEIGHT: 142 LBS | OXYGEN SATURATION: 100 % | BODY MASS INDEX: 24.24 KG/M2 | SYSTOLIC BLOOD PRESSURE: 127 MMHG | TEMPERATURE: 97.8 F

## 2023-09-26 PROCEDURE — 99215 OFFICE O/P EST HI 40 MIN: CPT

## 2023-10-06 ENCOUNTER — APPOINTMENT (OUTPATIENT)
Dept: BREAST CENTER | Facility: CLINIC | Age: 48
End: 2023-10-06
Payer: COMMERCIAL

## 2023-10-06 VITALS
WEIGHT: 139 LBS | DIASTOLIC BLOOD PRESSURE: 82 MMHG | SYSTOLIC BLOOD PRESSURE: 125 MMHG | HEART RATE: 109 BPM | HEIGHT: 64 IN | BODY MASS INDEX: 23.73 KG/M2

## 2023-10-06 DIAGNOSIS — Z85.3 PERSONAL HISTORY OF MALIGNANT NEOPLASM OF BREAST: ICD-10-CM

## 2023-10-06 DIAGNOSIS — C50.912 MALIGNANT NEOPLASM OF UNSPECIFIED SITE OF LEFT FEMALE BREAST: ICD-10-CM

## 2023-10-06 PROCEDURE — 99213 OFFICE O/P EST LOW 20 MIN: CPT

## 2023-12-05 DIAGNOSIS — F41.9 ANXIETY DISORDER, UNSPECIFIED: ICD-10-CM

## 2023-12-05 DIAGNOSIS — T45.1X5A FLUSHING: ICD-10-CM

## 2023-12-05 DIAGNOSIS — R23.2 FLUSHING: ICD-10-CM

## 2024-02-23 NOTE — HISTORY OF PRESENT ILLNESS
[Disease: _____________________] : Disease: [unfilled] [T: ___] : T[unfilled] [N: ___] : N[unfilled] [M: ___] : M[unfilled] [AJCC Stage: ____] : AJCC Stage: [unfilled] [de-identified] : 48F, with a history of left invasive lobular carcinoma s/p 11/16/21 left lumpectomy and SLNB, adjuvant RT 4005 cGy to the left partial breast (1/19/22-2/8/22).  She has been on tamoxifen since 2/2022. Here for f/u.  Fhx breast cancer in sister (46) and paternal aunt (39).  OncHx: 9/1/21: BL screening mammo/US: dense, LEFT 0.7 x 0.6 x 06 cm vertically oriented irregular mass @ 11:00 3cmfn which is suspicious in appearance.   9/29/21: L US core bx - invasive mammary carcinoma with lobular features ER positive 90% [place] positive 90% HER-2 negative, grade 2 with DCIS present. Heart clip.   10/14/21: MRI- Dense. L 0.8cm upper central clumped linear nonmass enhancement, bx proven malignancy. No evidence of skin, nipple or chest wall involvement. L 0.95cm central posterior, 4FN clumped linear nonmass enhancement. R 1.1cm central posterior, 3FN clumped nonmass enhancement. R 11:00, 4FN clumped nonmass enhancement w/ adjacent mass. No adenopathy.   10/18/21: L MRI guided bx for central enhancement- "Carlos" clip. LCIS and small IDP. Concordant. the clip is approximately 1 cm medial to the biopsy site which was located centrally in the breast  10/22/21: R MRI guided bx x2: SITE 1: R upper outer, 11:00 enhancement- "Cylinder" clip. Benign breast tissue w/ PASH.  SITE 2: R central enhancement- "Barbell" clip. Fibrocystic changes. Concordant.  11/16/21: L NLOC lumpectomy and SLNS: 0.8cm ILC- clear margins, extensive LCIS, 0/1 LN negative for carcinoma   1/19/22-2/8/22 4005 cGy to left partial breast.  3/28/22 L breast US: palpable oval complex cyst containing internal echoes in the left breast 3:00 (5N) measuring 29 x 17 x 22 mm.  4/5/22 left breast 3:00 5cmFN biopsy: breast tissue with cysts with surrounding inflammation and stromal fibrosis.  5/27/22 MRI breasts: Benign findings. No MR evidence of malignancy.    9/1/22: B/l MG & US- heterogenously dense. B/l bx clips. US- B/l numerous cysts. BI-RADS 2  5/17/23: MRI- MASSIEL. BI-RADS 2 [de-identified] : moderately differentiated invasive lobular carcinoma [de-identified] : ER+, CA+, HER2-, Oncotype RS 9 [de-identified] : Genetic testing negative [Treatment Protocol] : Treatment Protocol [FreeTextEntry1] : Tamoxifen 2/2022-present

## 2024-02-26 ENCOUNTER — APPOINTMENT (OUTPATIENT)
Dept: HEMATOLOGY ONCOLOGY | Facility: CLINIC | Age: 49
End: 2024-02-26
Payer: COMMERCIAL

## 2024-02-26 VITALS
DIASTOLIC BLOOD PRESSURE: 78 MMHG | HEART RATE: 78 BPM | SYSTOLIC BLOOD PRESSURE: 123 MMHG | WEIGHT: 155 LBS | RESPIRATION RATE: 18 BRPM | BODY MASS INDEX: 26.46 KG/M2 | TEMPERATURE: 97 F | HEIGHT: 64 IN | OXYGEN SATURATION: 97 %

## 2024-02-26 DIAGNOSIS — C50.912 MALIGNANT NEOPLASM OF UNSPECIFIED SITE OF LEFT FEMALE BREAST: ICD-10-CM

## 2024-02-26 PROCEDURE — G2211 COMPLEX E/M VISIT ADD ON: CPT

## 2024-02-26 PROCEDURE — 99213 OFFICE O/P EST LOW 20 MIN: CPT

## 2024-02-26 RX ORDER — TAMOXIFEN CITRATE 20 MG/1
20 TABLET, FILM COATED ORAL DAILY
Qty: 90 | Refills: 1 | Status: ACTIVE | COMMUNITY
Start: 2022-02-10 | End: 1900-01-01

## 2024-02-26 RX ORDER — VENLAFAXINE HYDROCHLORIDE 37.5 MG/1
37.5 CAPSULE, EXTENDED RELEASE ORAL DAILY
Qty: 90 | Refills: 1 | Status: ACTIVE | COMMUNITY
Start: 2023-12-05 | End: 1900-01-01

## 2024-02-26 NOTE — REVIEW OF SYSTEMS
[Recent Change In Weight] : ~T recent weight change [Diarrhea: Grade 0] : Diarrhea: Grade 0 [Anxiety] : anxiety [Hot Flashes] : hot flashes [Negative] : Allergic/Immunologic

## 2024-03-05 ENCOUNTER — APPOINTMENT (OUTPATIENT)
Dept: OBGYN | Facility: CLINIC | Age: 49
End: 2024-03-05
Payer: COMMERCIAL

## 2024-03-05 VITALS
WEIGHT: 155 LBS | OXYGEN SATURATION: 99 % | BODY MASS INDEX: 26.61 KG/M2 | SYSTOLIC BLOOD PRESSURE: 115 MMHG | DIASTOLIC BLOOD PRESSURE: 73 MMHG | HEART RATE: 99 BPM

## 2024-03-05 DIAGNOSIS — Z79.810 LONG TERM (CURRENT) USE OF SELECTIVE ESTROGEN RECEPTOR MODULATORS (SERMS): ICD-10-CM

## 2024-03-05 DIAGNOSIS — N95.1 MENOPAUSAL AND FEMALE CLIMACTERIC STATES: ICD-10-CM

## 2024-03-05 DIAGNOSIS — N89.8 OTHER SPECIFIED NONINFLAMMATORY DISORDERS OF VAGINA: ICD-10-CM

## 2024-03-05 DIAGNOSIS — N83.202 UNSPECIFIED OVARIAN CYST, LEFT SIDE: ICD-10-CM

## 2024-03-05 DIAGNOSIS — N83.519 TORSION OF OVARY AND OVARIAN PEDICLE, UNSPECIFIED SIDE: ICD-10-CM

## 2024-03-05 PROCEDURE — 99214 OFFICE O/P EST MOD 30 MIN: CPT

## 2024-03-05 NOTE — PLAN
[FreeTextEntry1] : here for gyn f/u visit was checking em lining due to tamoxifen use but noted cyst in left ovary:  left ovarian bilobed cyst seen: send for official sono and consult with Dr Parr  suspect yeast today; swab sent gyn issues: on tamoxifen, also no menses since Jan and started to have hot flashes perimenopause discussed Dr Montano recommended venlafaxine   2016 had torsion and right oophorectomy at [facility]    various issues discussed:  also new sexual partner/ female Over 50% of face to face time of visit counseling and coordination of care (total 30 m)

## 2024-03-05 NOTE — PHYSICAL EXAM
[Labia Majora] : normal [Labia Minora] : normal [Normal] : normal [Uterine Adnexae] : absent [___] : [unfilled] cm mass on the left [Appropriately responsive] : appropriately responsive [No Acute Distress] : no acute distress [Alert] : alert [Soft] : soft [Non-tender] : non-tender [No HSM] : No HSM [Non-distended] : non-distended [No Lesions] : no lesions [No Mass] : no mass [Oriented x3] : oriented x3 [Discharge] : a  ~M vaginal discharge was present [Moderate] : moderate [Thick] : thick [White] : white [FreeTextEntry6] : fullness left

## 2024-03-05 NOTE — HISTORY OF PRESENT ILLNESS
[Regular Cycle Intervals] : periods have been regular [FreeTextEntry1] : marco Tomlinson presents for follow up. gyn issues: on tamoxifen, also no menses since Jan and started to have hot flashes Dr Montano recommended venlafaxine

## 2024-03-11 LAB
CANDIDA VAG CYTO: DETECTED
G VAGINALIS+PREV SP MTYP VAG QL MICRO: NOT DETECTED
T VAGINALIS VAG QL WET PREP: NOT DETECTED

## 2024-03-21 ENCOUNTER — APPOINTMENT (OUTPATIENT)
Dept: OBGYN | Facility: CLINIC | Age: 49
End: 2024-03-21
Payer: COMMERCIAL

## 2024-03-21 VITALS
WEIGHT: 155 LBS | OXYGEN SATURATION: 98 % | DIASTOLIC BLOOD PRESSURE: 78 MMHG | HEART RATE: 114 BPM | SYSTOLIC BLOOD PRESSURE: 132 MMHG | BODY MASS INDEX: 26.46 KG/M2 | HEIGHT: 64 IN

## 2024-03-21 DIAGNOSIS — N83.292 OTHER OVARIAN CYST, LEFT SIDE: ICD-10-CM

## 2024-03-21 PROCEDURE — 99215 OFFICE O/P EST HI 40 MIN: CPT

## 2024-03-21 NOTE — HISTORY OF PRESENT ILLNESS
[FreeTextEntry1] : 47 yo patient presents for left ovarian cyst consultation. Patient states currently no symptoms. She was monitored every 6 months due to breast cancer. Imaging showed complex cyst 7.3cm no solid component. MRI was recommended.

## 2024-03-21 NOTE — PHYSICAL EXAM
[Appropriately responsive] : appropriately responsive [Chaperone Present] : A chaperone was present in the examining room during all aspects of the physical examination [Alert] : alert [No Acute Distress] : no acute distress [Oriented x3] : oriented x3 [Soft] : soft [Non-tender] : non-tender [Labia Majora] : normal [Labia Minora] : normal [Atrophy] : atrophy [Normal] : normal [Tenderness] : nontender [Anteversion] : anteverted [___] : [unfilled] cm mass on the left [Uterine Adnexae] : absent

## 2024-03-21 NOTE — DISCUSSION/SUMMARY
[FreeTextEntry1] : I sat down with the patient to discuss the imaging findings & her symptoms which warrant intervention. I explained that this type of ovarian cyst needs further testing to r/o malignancy. Reviewed management options for ovarian complex cyst including observation, and surgery. Previous oophorectomy for torsion.    -MRI referral -tumor markers -f/u 1 week

## 2024-03-22 ENCOUNTER — TRANSCRIPTION ENCOUNTER (OUTPATIENT)
Age: 49
End: 2024-03-22

## 2024-03-24 LAB
AFP-TM SERPL-MCNC: 2.4 NG/ML
CANCER AG125 SERPL-ACNC: 16 U/ML
CANCER AG19-9 SERPL-ACNC: 11 U/ML
CEA SERPL-MCNC: 2.5 NG/ML
HCG SERPL-MCNC: <1 MIU/ML

## 2024-03-27 LAB — CANCER AG27-29 SERPL-ACNC: 19.4 U/ML

## 2024-05-23 ENCOUNTER — APPOINTMENT (OUTPATIENT)
Dept: BREAST CENTER | Facility: CLINIC | Age: 49
End: 2024-05-23
Payer: COMMERCIAL

## 2024-05-23 ENCOUNTER — NON-APPOINTMENT (OUTPATIENT)
Age: 49
End: 2024-05-23

## 2024-05-23 VITALS
SYSTOLIC BLOOD PRESSURE: 118 MMHG | WEIGHT: 162 LBS | BODY MASS INDEX: 27.66 KG/M2 | HEIGHT: 64 IN | DIASTOLIC BLOOD PRESSURE: 78 MMHG | HEART RATE: 106 BPM

## 2024-05-23 DIAGNOSIS — Z85.3 ENCOUNTER FOR FOLLOW-UP EXAMINATION AFTER COMPLETED TREATMENT FOR MALIGNANT NEOPLASM: ICD-10-CM

## 2024-05-23 DIAGNOSIS — Z08 ENCOUNTER FOR FOLLOW-UP EXAMINATION AFTER COMPLETED TREATMENT FOR MALIGNANT NEOPLASM: ICD-10-CM

## 2024-05-23 DIAGNOSIS — Z85.3 PERSONAL HISTORY OF MALIGNANT NEOPLASM OF BREAST: ICD-10-CM

## 2024-05-23 DIAGNOSIS — Z79.810 LONG TERM (CURRENT) USE OF SELECTIVE ESTROGEN RECEPTOR MODULATORS (SERMS): ICD-10-CM

## 2024-05-23 DIAGNOSIS — Z80.3 FAMILY HISTORY OF MALIGNANT NEOPLASM OF BREAST: ICD-10-CM

## 2024-05-23 PROCEDURE — 99213 OFFICE O/P EST LOW 20 MIN: CPT

## 2024-05-23 RX ORDER — FLUCONAZOLE 150 MG/1
150 TABLET ORAL
Qty: 1 | Refills: 10 | Status: DISCONTINUED | COMMUNITY
Start: 2024-03-11 | End: 2024-05-23

## 2024-05-23 NOTE — HISTORY OF PRESENT ILLNESS
[FreeTextEntry1] : Patient is a 49yo F here for breast cancer surveillance. Hx of LEFT NLOC lumpectomy & SNB on 11/16/21 (age 46) yielding 0.8cm ILC (ER+/[place]+/HER2-) with clear margins, extensive LCIS, 0/1 LN. Oncotype RS 9. S/p RT (completed 2/2022, Dr. Guillermo). Currently on Tamoxifen (Dr. Montano). Hx of b/l MRI guided bx 10/2021 yielding R benign tissue and L LCIS with minute papilloma. Fhx of breast cancer in paternal aunt (age 39).  Patient is BRCA/full panel negative (tested in 2021). She denies palp mass or nipple discharge.  TNM Stage: p T1b, pN0, pMX  AJCC Stage (8th Ed): IA  8/20/19: B/l MG & US- BIRAD 2 9/1/21: B/l MG & US- dense,  LEFT 0.7x0.6x06 cm vertically oriented irregular mass @ 11:00 3cmfn which is suspicious in appearance - BIRAD 4  9/29/21: L US core bx- invasive mammary carcinoma with lobular features  ER positive 90% [place] positive 90% HER-2 negative, grade 2 with DCIS present. Heart clip.  10/14/21: MRI- Dense. L 0.8cm upper central clumped linear nonmass enhancement, bx proven malignancy. No evidence of skin, nipple or chest wall involvement. L 0.95cm central posterior, 4FN clumped linear nonmass enhancement (Rec. MRI guided bx). R 1.1cm central posterior, 3FN clumped nonmass enhancement (MRI bx rec.). R 11:00, 4FN clumped nonmass enhancement w/ adjacent mass (MR bx rec.) No adenopathy. BIRADS 4 10/18/21: L MRI guided bx for central enhancement- "Carlos" clip. LCIS and small IDP. Concordant. The clip is approximately 1 cm medial to the biopsy site which was located centrally in the breast 10/22/21: R MRI guided bx x2: SITE 1: R upper outer, 11:00 enhancement- "Cylinder" clip. Benign breast tissue w/ PASH. SITE 2: R central enhancement- "Barbell" clip. Fibrocystic changes. Concordant. 11/16/21: L NLOC lumpectomy and SLNS: 0.8cm ILC- clear margins, extensive LCIS, 0/1 LN negative for carcinoma 3/28/22: L US- L 2.9cm oval complex cyst containing internal echoes 3:00 5FN (palpable). Rec therapeutic aspiration. BI-RADS 2 4/5/22: L US bx 3:00 (cork clip)- breast tissue with cysts with surrounding inflammation and stromal fibrosis (benign and concordant). Rec 6m f/u L MG/US 5/27/22: MRI- No MR evidence of malignancy. BI-RADS 2 9/1/22: B/l MG & US- heterogeneously dense. B/l bx clips. US- B/l numerous cysts. BI-RADS 2 5/17/23: MRI- R breast cysts. MASSIEL. BI-RADS 2 10/6/23: B/l MG & US- heterogeneously dense. L post lumpx changes. US- B/l scattered cysts. MASSIEL. BI-RADS 2 5/23/24: MRI- heterogeneously dense. MASSIEL. BI-RADS 1

## 2024-05-23 NOTE — PHYSICAL EXAM
[de-identified] : R breast/axilla/supraclavicular area: No masses, discharge, or adenopathy\par   [de-identified] : L breast/axilla/supraclavicular area-no evidence of recurrence

## 2024-05-23 NOTE — PAST MEDICAL HISTORY
[History of Hormone Replacement Treatment] : has no history of hormone replacement treatment [FreeTextEntry5] : laparoscopic removal of right ovary and fallopian tbe [FreeTextEntry6] : n/a [FreeTextEntry7] : up until 10/5/21 [FreeTextEntry8] : n/a

## 2024-09-06 RX ORDER — CLONAZEPAM 0.5 MG/1
0.5 TABLET ORAL
Qty: 10 | Refills: 0 | Status: ACTIVE | COMMUNITY
Start: 2024-09-06 | End: 1900-01-01

## 2024-09-25 ENCOUNTER — APPOINTMENT (OUTPATIENT)
Dept: OBGYN | Facility: CLINIC | Age: 49
End: 2024-09-25
Payer: COMMERCIAL

## 2024-09-25 VITALS
OXYGEN SATURATION: 100 % | SYSTOLIC BLOOD PRESSURE: 129 MMHG | WEIGHT: 169 LBS | BODY MASS INDEX: 28.85 KG/M2 | HEIGHT: 64 IN | HEART RATE: 119 BPM | DIASTOLIC BLOOD PRESSURE: 75 MMHG

## 2024-09-25 DIAGNOSIS — Z01.419 ENCOUNTER FOR GYNECOLOGICAL EXAMINATION (GENERAL) (ROUTINE) W/OUT ABNORMAL FINDINGS: ICD-10-CM

## 2024-09-25 PROCEDURE — 99396 PREV VISIT EST AGE 40-64: CPT

## 2024-09-25 NOTE — PLAN
[FreeTextEntry1] : annual; pap and hpv  hot flashes, lmp june was doing well on effexor but too much wt gain so came off d/w pt relizen also mentioned emilia sapp cancer: gets imaging q 6 months and see Dr Romelia Berg

## 2024-09-27 LAB — HPV HIGH+LOW RISK DNA PNL CVX: NOT DETECTED

## 2024-10-03 ENCOUNTER — TRANSCRIPTION ENCOUNTER (OUTPATIENT)
Age: 49
End: 2024-10-03

## 2024-10-03 LAB — CYTOLOGY CVX/VAG DOC THIN PREP: NORMAL

## 2024-10-08 ENCOUNTER — APPOINTMENT (OUTPATIENT)
Dept: RADIATION ONCOLOGY | Facility: CLINIC | Age: 49
End: 2024-10-08
Payer: COMMERCIAL

## 2024-10-08 VITALS
SYSTOLIC BLOOD PRESSURE: 126 MMHG | DIASTOLIC BLOOD PRESSURE: 84 MMHG | BODY MASS INDEX: 27.66 KG/M2 | HEIGHT: 64 IN | TEMPERATURE: 98.5 F | HEART RATE: 100 BPM | WEIGHT: 162 LBS | OXYGEN SATURATION: 98 % | RESPIRATION RATE: 18 BRPM

## 2024-10-08 PROCEDURE — 99215 OFFICE O/P EST HI 40 MIN: CPT

## 2024-10-18 ENCOUNTER — APPOINTMENT (OUTPATIENT)
Dept: HEMATOLOGY ONCOLOGY | Facility: CLINIC | Age: 49
End: 2024-10-18
Payer: COMMERCIAL

## 2024-10-18 VITALS
WEIGHT: 167 LBS | TEMPERATURE: 97.8 F | DIASTOLIC BLOOD PRESSURE: 80 MMHG | RESPIRATION RATE: 18 BRPM | OXYGEN SATURATION: 98 % | SYSTOLIC BLOOD PRESSURE: 116 MMHG | HEIGHT: 64 IN | BODY MASS INDEX: 28.51 KG/M2 | HEART RATE: 87 BPM

## 2024-10-18 DIAGNOSIS — C50.912 MALIGNANT NEOPLASM OF UNSPECIFIED SITE OF LEFT FEMALE BREAST: ICD-10-CM

## 2024-10-18 DIAGNOSIS — R92.8 OTHER ABNORMAL AND INCONCLUSIVE FINDINGS ON DIAGNOSTIC IMAGING OF BREAST: ICD-10-CM

## 2024-10-18 PROCEDURE — 99215 OFFICE O/P EST HI 40 MIN: CPT

## 2024-10-18 RX ORDER — FLUTICASONE PROPIONATE 50 MCG
50 SPRAY, SUSPENSION NASAL
Refills: 0 | Status: ACTIVE | COMMUNITY

## 2024-10-18 RX ORDER — ALBUTEROL SULFATE 90 UG/1
INHALANT RESPIRATORY (INHALATION)
Refills: 0 | Status: ACTIVE | COMMUNITY

## 2024-10-23 ENCOUNTER — NON-APPOINTMENT (OUTPATIENT)
Age: 49
End: 2024-10-23

## 2024-12-02 RX ORDER — ESCITALOPRAM OXALATE 10 MG/1
10 TABLET ORAL DAILY
Qty: 90 | Refills: 0 | Status: ACTIVE | COMMUNITY
Start: 2024-11-27 | End: 1900-01-01

## 2025-02-24 RX ORDER — ESCITALOPRAM OXALATE 20 MG/1
20 TABLET ORAL DAILY
Qty: 90 | Refills: 1 | Status: ACTIVE | COMMUNITY
Start: 2025-02-24 | End: 1900-01-01

## 2025-03-26 ENCOUNTER — APPOINTMENT (OUTPATIENT)
Dept: OBGYN | Facility: CLINIC | Age: 50
End: 2025-03-26
Payer: COMMERCIAL

## 2025-03-26 VITALS
WEIGHT: 165 LBS | BODY MASS INDEX: 28.32 KG/M2 | DIASTOLIC BLOOD PRESSURE: 78 MMHG | SYSTOLIC BLOOD PRESSURE: 111 MMHG | HEART RATE: 89 BPM | OXYGEN SATURATION: 98 %

## 2025-03-26 DIAGNOSIS — Z79.810 LONG TERM (CURRENT) USE OF SELECTIVE ESTROGEN RECEPTOR MODULATORS (SERMS): ICD-10-CM

## 2025-03-26 DIAGNOSIS — N83.292 OTHER OVARIAN CYST, LEFT SIDE: ICD-10-CM

## 2025-03-26 PROCEDURE — 99213 OFFICE O/P EST LOW 20 MIN: CPT | Mod: 25

## 2025-03-26 PROCEDURE — 76830 TRANSVAGINAL US NON-OB: CPT

## 2025-04-08 ENCOUNTER — TRANSCRIPTION ENCOUNTER (OUTPATIENT)
Age: 50
End: 2025-04-08

## 2025-05-02 ENCOUNTER — APPOINTMENT (OUTPATIENT)
Dept: HEMATOLOGY ONCOLOGY | Facility: CLINIC | Age: 50
End: 2025-05-02
Payer: COMMERCIAL

## 2025-05-02 VITALS
HEIGHT: 64 IN | DIASTOLIC BLOOD PRESSURE: 76 MMHG | RESPIRATION RATE: 18 BRPM | OXYGEN SATURATION: 98 % | TEMPERATURE: 98.2 F | HEART RATE: 80 BPM | SYSTOLIC BLOOD PRESSURE: 116 MMHG | BODY MASS INDEX: 28.85 KG/M2 | WEIGHT: 169 LBS

## 2025-05-02 DIAGNOSIS — C50.912 MALIGNANT NEOPLASM OF UNSPECIFIED SITE OF LEFT FEMALE BREAST: ICD-10-CM

## 2025-05-02 PROCEDURE — 99215 OFFICE O/P EST HI 40 MIN: CPT

## 2025-05-12 ENCOUNTER — TRANSCRIPTION ENCOUNTER (OUTPATIENT)
Age: 50
End: 2025-05-12

## 2025-05-13 ENCOUNTER — APPOINTMENT (OUTPATIENT)
Dept: OBGYN | Facility: CLINIC | Age: 50
End: 2025-05-13
Payer: COMMERCIAL

## 2025-05-13 VITALS
WEIGHT: 169 LBS | SYSTOLIC BLOOD PRESSURE: 110 MMHG | DIASTOLIC BLOOD PRESSURE: 76 MMHG | BODY MASS INDEX: 29.01 KG/M2 | HEART RATE: 90 BPM | OXYGEN SATURATION: 99 %

## 2025-05-13 DIAGNOSIS — R39.9 UNSPECIFIED SYMPTOMS AND SIGNS INVOLVING THE GENITOURINARY SYSTEM: ICD-10-CM

## 2025-05-13 DIAGNOSIS — N89.8 OTHER SPECIFIED NONINFLAMMATORY DISORDERS OF VAGINA: ICD-10-CM

## 2025-05-13 DIAGNOSIS — N39.44 NOCTURNAL ENURESIS: ICD-10-CM

## 2025-05-13 PROCEDURE — 81002 URINALYSIS NONAUTO W/O SCOPE: CPT

## 2025-05-13 PROCEDURE — 99213 OFFICE O/P EST LOW 20 MIN: CPT

## 2025-05-14 LAB
APPEARANCE: CLEAR
BILIRUBIN URINE: NEGATIVE
BLOOD URINE: NEGATIVE
COLOR: YELLOW
GLUCOSE QUALITATIVE U: NEGATIVE MG/DL
KETONES URINE: NEGATIVE MG/DL
LEUKOCYTE ESTERASE URINE: NEGATIVE
NITRITE URINE: NEGATIVE
PH URINE: 6.5
PROTEIN URINE: NEGATIVE MG/DL
SPECIFIC GRAVITY URINE: 1.01
UROBILINOGEN URINE: 0.2 MG/DL

## 2025-05-15 DIAGNOSIS — B37.31 ACUTE CANDIDIASIS OF VULVA AND VAGINA: ICD-10-CM

## 2025-05-15 LAB
BACTERIA UR CULT: NORMAL
BV BACTERIA RRNA VAG QL NAA+PROBE: NOT DETECTED
C GLABRATA RNA VAG QL NAA+PROBE: NOT DETECTED
CANDIDA RRNA VAG QL PROBE: DETECTED
T VAGINALIS RRNA SPEC QL NAA+PROBE: NOT DETECTED

## 2025-05-15 RX ORDER — FLUCONAZOLE 150 MG/1
150 TABLET ORAL
Qty: 3 | Refills: 1 | Status: ACTIVE | COMMUNITY
Start: 2025-05-15 | End: 1900-01-01

## 2025-05-22 PROBLEM — R92.30 DENSE BREASTS: Status: ACTIVE | Noted: 2025-05-22

## 2025-05-28 ENCOUNTER — APPOINTMENT (OUTPATIENT)
Dept: BREAST CENTER | Facility: CLINIC | Age: 50
End: 2025-05-28
Payer: COMMERCIAL

## 2025-05-28 VITALS
DIASTOLIC BLOOD PRESSURE: 85 MMHG | HEART RATE: 82 BPM | SYSTOLIC BLOOD PRESSURE: 124 MMHG | BODY MASS INDEX: 28.16 KG/M2 | HEIGHT: 65 IN | WEIGHT: 169 LBS

## 2025-05-28 DIAGNOSIS — Z85.3 PERSONAL HISTORY OF MALIGNANT NEOPLASM OF BREAST: ICD-10-CM

## 2025-05-28 DIAGNOSIS — R92.30 DENSE BREASTS, UNSPECIFIED: ICD-10-CM

## 2025-05-28 PROCEDURE — 99213 OFFICE O/P EST LOW 20 MIN: CPT
